# Patient Record
Sex: FEMALE | Employment: UNEMPLOYED | URBAN - METROPOLITAN AREA
[De-identification: names, ages, dates, MRNs, and addresses within clinical notes are randomized per-mention and may not be internally consistent; named-entity substitution may affect disease eponyms.]

---

## 2018-05-25 ENCOUNTER — HOSPITAL ENCOUNTER (EMERGENCY)
Age: 53
Discharge: HOME | End: 2018-05-25
Attending: EMERGENCY MEDICINE | Admitting: EMERGENCY MEDICINE
Payer: SELF-PAY

## 2018-05-25 ENCOUNTER — APPOINTMENT (OUTPATIENT)
Dept: GENERAL RADIOLOGY | Age: 53
End: 2018-05-25
Attending: PHYSICIAN ASSISTANT
Payer: SELF-PAY

## 2018-05-25 VITALS
DIASTOLIC BLOOD PRESSURE: 100 MMHG | RESPIRATION RATE: 20 BRPM | HEART RATE: 128 BPM | TEMPERATURE: 97.6 F | SYSTOLIC BLOOD PRESSURE: 138 MMHG | OXYGEN SATURATION: 95 %

## 2018-05-25 DIAGNOSIS — J18.9 PNEUMONIA OF RIGHT UPPER LOBE DUE TO INFECTIOUS ORGANISM: Primary | ICD-10-CM

## 2018-05-25 LAB
ANION GAP SERPL CALC-SCNC: 7 MMOL/L (ref 3–18)
BASOPHILS # BLD: 0 K/UL (ref 0–0.1)
BASOPHILS NFR BLD: 0 % (ref 0–2)
BUN SERPL-MCNC: 14 MG/DL (ref 7–18)
BUN/CREAT SERPL: 15 (ref 12–20)
CALCIUM SERPL-MCNC: 8.4 MG/DL (ref 8.5–10.1)
CHLORIDE SERPL-SCNC: 104 MMOL/L (ref 100–108)
CO2 SERPL-SCNC: 27 MMOL/L (ref 21–32)
CREAT SERPL-MCNC: 0.94 MG/DL (ref 0.6–1.3)
DIFFERENTIAL METHOD BLD: ABNORMAL
EOSINOPHIL # BLD: 0 K/UL (ref 0–0.4)
EOSINOPHIL NFR BLD: 0 % (ref 0–5)
ERYTHROCYTE [DISTWIDTH] IN BLOOD BY AUTOMATED COUNT: 12.4 % (ref 11.6–14.5)
GLUCOSE SERPL-MCNC: 118 MG/DL (ref 74–99)
HCT VFR BLD AUTO: 36.5 % (ref 35–45)
HGB BLD-MCNC: 12.8 G/DL (ref 12–16)
LYMPHOCYTES # BLD: 2.5 K/UL (ref 0.9–3.6)
LYMPHOCYTES NFR BLD: 27 % (ref 21–52)
MCH RBC QN AUTO: 31.2 PG (ref 24–34)
MCHC RBC AUTO-ENTMCNC: 35.1 G/DL (ref 31–37)
MCV RBC AUTO: 89 FL (ref 74–97)
MONOCYTES # BLD: 0.6 K/UL (ref 0.05–1.2)
MONOCYTES NFR BLD: 6 % (ref 3–10)
NEUTS SEG # BLD: 5.9 K/UL (ref 1.8–8)
NEUTS SEG NFR BLD: 67 % (ref 40–73)
PLATELET # BLD AUTO: 164 K/UL (ref 135–420)
PMV BLD AUTO: 9.6 FL (ref 9.2–11.8)
POTASSIUM SERPL-SCNC: 3.4 MMOL/L (ref 3.5–5.5)
RBC # BLD AUTO: 4.1 M/UL (ref 4.2–5.3)
SODIUM SERPL-SCNC: 138 MMOL/L (ref 136–145)
WBC # BLD AUTO: 9 K/UL (ref 4.6–13.2)

## 2018-05-25 PROCEDURE — 74011636637 HC RX REV CODE- 636/637: Performed by: EMERGENCY MEDICINE

## 2018-05-25 PROCEDURE — 94640 AIRWAY INHALATION TREATMENT: CPT

## 2018-05-25 PROCEDURE — 74011250636 HC RX REV CODE- 250/636: Performed by: PHYSICIAN ASSISTANT

## 2018-05-25 PROCEDURE — 74011250637 HC RX REV CODE- 250/637: Performed by: EMERGENCY MEDICINE

## 2018-05-25 PROCEDURE — 71045 X-RAY EXAM CHEST 1 VIEW: CPT

## 2018-05-25 PROCEDURE — 74011000250 HC RX REV CODE- 250: Performed by: PHYSICIAN ASSISTANT

## 2018-05-25 PROCEDURE — 77030029684 HC NEB SM VOL KT MONA -A

## 2018-05-25 PROCEDURE — 96374 THER/PROPH/DIAG INJ IV PUSH: CPT

## 2018-05-25 PROCEDURE — 99283 EMERGENCY DEPT VISIT LOW MDM: CPT

## 2018-05-25 PROCEDURE — 80048 BASIC METABOLIC PNL TOTAL CA: CPT | Performed by: PHYSICIAN ASSISTANT

## 2018-05-25 PROCEDURE — 85025 COMPLETE CBC W/AUTO DIFF WBC: CPT | Performed by: PHYSICIAN ASSISTANT

## 2018-05-25 RX ORDER — ALBUTEROL SULFATE 90 UG/1
2 AEROSOL, METERED RESPIRATORY (INHALATION)
Status: COMPLETED | OUTPATIENT
Start: 2018-05-25 | End: 2018-05-25

## 2018-05-25 RX ORDER — IPRATROPIUM BROMIDE AND ALBUTEROL SULFATE 2.5; .5 MG/3ML; MG/3ML
3 SOLUTION RESPIRATORY (INHALATION)
Status: COMPLETED | OUTPATIENT
Start: 2018-05-25 | End: 2018-05-25

## 2018-05-25 RX ORDER — PREDNISONE 20 MG/1
40 TABLET ORAL
Status: COMPLETED | OUTPATIENT
Start: 2018-05-25 | End: 2018-05-25

## 2018-05-25 RX ORDER — AZITHROMYCIN 250 MG/1
TABLET, FILM COATED ORAL
Qty: 4 TAB | Refills: 0 | Status: SHIPPED | OUTPATIENT
Start: 2018-05-26 | End: 2018-05-28

## 2018-05-25 RX ORDER — PREDNISONE 20 MG/1
40 TABLET ORAL DAILY
Qty: 8 TAB | Refills: 0 | Status: SHIPPED | OUTPATIENT
Start: 2018-05-26 | End: 2018-05-28

## 2018-05-25 RX ORDER — AZITHROMYCIN 250 MG/1
500 TABLET, FILM COATED ORAL
Status: COMPLETED | OUTPATIENT
Start: 2018-05-25 | End: 2018-05-25

## 2018-05-25 RX ADMIN — IPRATROPIUM BROMIDE AND ALBUTEROL SULFATE 3 ML: .5; 3 SOLUTION RESPIRATORY (INHALATION) at 15:39

## 2018-05-25 RX ADMIN — AZITHROMYCIN 500 MG: 250 TABLET, FILM COATED ORAL at 15:38

## 2018-05-25 RX ADMIN — ALBUTEROL SULFATE 2 PUFF: 90 AEROSOL, METERED RESPIRATORY (INHALATION) at 15:38

## 2018-05-25 RX ADMIN — METHYLPREDNISOLONE SODIUM SUCCINATE 125 MG: 125 INJECTION, POWDER, FOR SOLUTION INTRAMUSCULAR; INTRAVENOUS at 13:48

## 2018-05-25 RX ADMIN — PREDNISONE 40 MG: 20 TABLET ORAL at 15:38

## 2018-05-25 NOTE — ED PROVIDER NOTES
EMERGENCY DEPARTMENT HISTORY AND PHYSICAL EXAM    3:12 PM      Date: 5/25/2018  Patient Name: Tawanda Avery    History of Presenting Illness     Chief Complaint   Patient presents with    Cough         History Provided By: Patient    Chief Complaint: Cough    Duration: 10 Days  Timing:  Constant  Location: N/A  Quality: N/A  Severity: Moderate  Modifying Factors: Albuterol, Mucinex, and Tylenol did not help sx  Associated Symptoms: Intermitten fever      Additional History (Context): 3:15 PM Tawanda Avery is a 46 y.o. female with h/o fibromyalgia who presents to ED complaining of a moderate, constant non-productive cough with onset 10 days ago. Patient states that her albuterol, mucinex, and tylenol did not help. She has associated sx of an intermittent fever. She states that her grandchildren have been sick recently. She also state she \"feels like she can't breath\" and states she also has COPD. She currently smokes a half a pack a day. She has a history of DVT in her RLE. Patient is allergic to penicillin. No other concerns or symptoms at this time. PCP: Fredy Velez MD        PCP: Fredy Velez MD    Current Facility-Administered Medications   Medication Dose Route Frequency Provider Last Rate Last Dose    inhalational spacing device   Does Not Apply PRN Young Adames MD         Current Outpatient Prescriptions   Medication Sig Dispense Refill    [START ON 5/26/2018] azithromycin (ZITHROMAX) 250 mg tablet Does not need z-jojo- first dose given in ed  Take one tablet by mouth daily for next 4 days 4 Tab 0    [START ON 5/26/2018] predniSONE (DELTASONE) 20 mg tablet Take 2 Tabs by mouth daily for 4 days. With Breakfast 8 Tab 0    ibuprofen (MOTRIN) 800 mg tablet Take 800 mg by mouth daily.  ALPRAZolam (XANAX) 0.25 mg tablet Take 1 mg by mouth daily.  naproxen (NAPROSYN) 500 mg tablet Take 1 Tab by mouth two (2) times daily (with meals).  20 Tab 0    albuterol (PROVENTIL HFA, VENTOLIN HFA, PROAIR HFA) 90 mcg/actuation inhaler Take 2 Puffs by inhalation every four (4) hours as needed for Wheezing or Shortness of Breath. 1 Inhaler 0    oxyCODONE-acetaminophen (PERCOCET)  mg per tablet Take 1 Tab by mouth every six (6) hours as needed for Pain. Past History     Past Medical History:  Past Medical History:   Diagnosis Date    Chronic back pain     Fibromyalgia        Past Surgical History:  History reviewed. No pertinent surgical history. Family History:  History reviewed. No pertinent family history. Social History:  Social History   Substance Use Topics    Smoking status: Heavy Tobacco Smoker     Packs/day: 1.00     Years: 30.00    Smokeless tobacco: None    Alcohol use No       Allergies: Allergies   Allergen Reactions    Pcn [Penicillins] Hives         Review of Systems       Review of Systems   Constitutional: Positive for fever. Negative for activity change, appetite change, chills, diaphoresis, fatigue and unexpected weight change. HENT: Negative for congestion, dental problem, drooling, ear discharge, ear pain, facial swelling, hearing loss, mouth sores, nosebleeds, postnasal drip, rhinorrhea, sinus pressure, sneezing, sore throat, tinnitus and trouble swallowing. Eyes: Negative for photophobia, pain, discharge, redness, itching and visual disturbance. Respiratory: Positive for cough. Negative for apnea, choking, chest tightness, shortness of breath, wheezing and stridor. Cardiovascular: Negative for chest pain, palpitations and leg swelling. Gastrointestinal: Negative for abdominal distention, abdominal pain, anal bleeding, blood in stool, constipation, diarrhea, nausea, rectal pain and vomiting. Endocrine: Negative for cold intolerance, heat intolerance, polydipsia, polyphagia and polyuria. Genitourinary: Negative for decreased urine volume, difficulty urinating, dysuria, enuresis, flank pain, frequency, genital sores, hematuria and urgency. Musculoskeletal: Negative for arthralgias, back pain, gait problem, joint swelling, myalgias, neck pain and neck stiffness. Skin: Negative for color change, pallor, rash and wound. Allergic/Immunologic: Negative for environmental allergies, food allergies and immunocompromised state. Neurological: Negative for dizziness, tremors, seizures, syncope, facial asymmetry, speech difficulty, weakness, light-headedness, numbness and headaches. Hematological: Negative for adenopathy. Does not bruise/bleed easily. Psychiatric/Behavioral: Negative for agitation, behavioral problems, confusion, decreased concentration, dysphoric mood, hallucinations, self-injury, sleep disturbance and suicidal ideas. The patient is not nervous/anxious and is not hyperactive. Physical Exam     Visit Vitals    BP (!) 138/100 (BP 1 Location: Left arm, BP Patient Position: Sitting)    Pulse (!) 128    Temp 97.6 °F (36.4 °C)    Resp 20    SpO2 95%         Physical Exam   Constitutional: She is oriented to person, place, and time. She appears well-developed and well-nourished. Alert and appropriate in no apparent marked discomfort or acute respiratory distress   HENT:   Head: Normocephalic and atraumatic. Right Ear: External ear normal.   Left Ear: External ear normal.   Mouth/Throat: Oropharynx is clear and moist. No oropharyngeal exudate. Eyes: Conjunctivae and EOM are normal. Pupils are equal, round, and reactive to light. Right eye exhibits no discharge. Left eye exhibits no discharge. No scleral icterus. Neck: Normal range of motion. Neck supple. No JVD present. No tracheal deviation present. No thyromegaly present. Cardiovascular: Regular rhythm, normal heart sounds and intact distal pulses. Exam reveals no gallop and no friction rub. No murmur heard. Tachycardia but regular   Pulmonary/Chest: Effort normal. No respiratory distress. She has no wheezes. She has no rales. She exhibits no tenderness. Breath sounds distant without rhonchi, prolonged expiratory phase or diminished air exchange   Abdominal: Soft. Bowel sounds are normal. She exhibits no distension. There is no tenderness. There is no rebound and no guarding. Musculoskeletal: Normal range of motion. She exhibits no edema or tenderness. Lymphadenopathy:     She has no cervical adenopathy. Neurological: She is alert and oriented to person, place, and time. No cranial nerve deficit. Coordination normal.   Skin: Skin is warm. No erythema. Psychiatric: She has a normal mood and affect. Her behavior is normal. Judgment and thought content normal.   Nursing note and vitals reviewed. Diagnostic Study Results     Labs -  Recent Results (from the past 12 hour(s))   CBC WITH AUTOMATED DIFF    Collection Time: 05/25/18  2:43 PM   Result Value Ref Range    WBC 9.0 4.6 - 13.2 K/uL    RBC 4.10 (L) 4.20 - 5.30 M/uL    HGB 12.8 12.0 - 16.0 g/dL    HCT 36.5 35.0 - 45.0 %    MCV 89.0 74.0 - 97.0 FL    MCH 31.2 24.0 - 34.0 PG    MCHC 35.1 31.0 - 37.0 g/dL    RDW 12.4 11.6 - 14.5 %    PLATELET 712 488 - 830 K/uL    MPV 9.6 9.2 - 11.8 FL    NEUTROPHILS 67 40 - 73 %    LYMPHOCYTES 27 21 - 52 %    MONOCYTES 6 3 - 10 %    EOSINOPHILS 0 0 - 5 %    BASOPHILS 0 0 - 2 %    ABS. NEUTROPHILS 5.9 1.8 - 8.0 K/UL    ABS. LYMPHOCYTES 2.5 0.9 - 3.6 K/UL    ABS. MONOCYTES 0.6 0.05 - 1.2 K/UL    ABS. EOSINOPHILS 0.0 0.0 - 0.4 K/UL    ABS.  BASOPHILS 0.0 0.0 - 0.1 K/UL    DF AUTOMATED     METABOLIC PANEL, BASIC    Collection Time: 05/25/18  2:43 PM   Result Value Ref Range    Sodium 138 136 - 145 mmol/L    Potassium 3.4 (L) 3.5 - 5.5 mmol/L    Chloride 104 100 - 108 mmol/L    CO2 27 21 - 32 mmol/L    Anion gap 7 3.0 - 18 mmol/L    Glucose 118 (H) 74 - 99 mg/dL    BUN 14 7.0 - 18 MG/DL    Creatinine 0.94 0.6 - 1.3 MG/DL    BUN/Creatinine ratio 15 12 - 20      GFR est AA >60 >60 ml/min/1.73m2    GFR est non-AA >60 >60 ml/min/1.73m2    Calcium 8.4 (L) 8.5 - 10.1 MG/DL Radiologic Studies -   XR CHEST PORT   Final Result   IMPRESSION  IMPRESSION:     Patchy focal infiltrate in the right upper lung likely pneumonia. Recommend  follow-up to document clearing with repeat x-ray in 6-8 weeks. Mild cardiomegaly. Atherosclerosis. Medical Decision Making   I am the first provider for this patient. I reviewed the vital signs, available nursing notes, past medical history, past surgical history, family history and social history. Vital Signs-Reviewed the patient's vital signs. Pulse Oximetry Analysis -  95% on room air (Interpretation)    Records Reviewed: Nursing Notes and Old Medical Records (Time of Review: 3:12 PM)    ED Course: Progress Notes, Reevaluation, and Consults:  Patient had no signs of fever, hemodynamic instability or hypoxemia. She was given oral steroids and antibiotics with planned close outpatient follow-up. Precautions given for pneumonia and COPD prior to discharge. Patient agreed with this plan and will follow-up for blood pressure recheck when no longer acutely ill. Provider Notes (Medical Decision Making): Patient with history consistent bronchitis versus pneumonia worsened by underlying COPD. Prior history of DVT but infectious source much more likely. If CXR is unremarkable with reconsider DVT/PE evaluation but no chest pain or leg swelling. Will cover for infectious sources as well as likely COPD component while evaluating. Patient agrees with this plan. Diagnosis     Clinical Impression:   1.  Pneumonia of right upper lobe due to infectious organism [facility])        Disposition: Discharge    Follow-up Information     Follow up With Details Comments Contact Info    [facility] EMERGENCY DEPT  As needed, If symptoms worsen Alessandro Kumar MD In 3 days As needed, If symptoms are not improving- also will need follow-up to make sure pneumonia on x-ray has resolved [id number] [id number]  [phone]             Discharge Medication List as of 5/25/2018  4:05 PM      START taking these medications    Details   azithromycin (ZITHROMAX) 250 mg tablet Does not need z-jojo- first dose given in ed  Take one tablet by mouth daily for next 4 days, Print, Disp-4 Tab, R-0      predniSONE (DELTASONE) 20 mg tablet Take 2 Tabs by mouth daily for 4 days. With Breakfast, Print, Disp-8 Tab, R-0         CONTINUE these medications which have NOT CHANGED    Details   ibuprofen (MOTRIN) 800 mg tablet Take 800 mg by mouth daily. , Historical Med      ALPRAZolam (XANAX) 0.25 mg tablet Take 1 mg by mouth daily. , Historical Med      naproxen (NAPROSYN) 500 mg tablet Take 1 Tab by mouth two (2) times daily (with meals). , Print, Disp-20 Tab, R-0      albuterol (PROVENTIL HFA, VENTOLIN HFA, PROAIR HFA) 90 mcg/actuation inhaler Take 2 Puffs by inhalation every four (4) hours as needed for Wheezing or Shortness of Breath., Print, Disp-1 Inhaler, R-0      oxyCODONE-acetaminophen (PERCOCET)  mg per tablet Take 1 Tab by mouth every six (6) hours as needed for Pain., Historical Med         STOP taking these medications       predniSONE (STERAPRED DS) 10 mg dose pack Comments:   Reason for Stopping:             _______________________________    Attestations:  Scribe Attestation     Ondina Sapp and Cassie Scott acting as a scribe for and in the presence of Maida Harris MD      May 25, 2018 at 3:12 PM       Provider Attestation:      I personally performed the services described in the documentation, reviewed the documentation, as recorded by the scribe in my presence, and it accurately and completely records my words and actions.  May 25, 2018 at 3:12 PM - Maida Harris MD    _______________________________

## 2018-05-25 NOTE — ED TRIAGE NOTES
Pt to ED with C/o Cough and SOB, pt able to speak in full sentences without difficulty and states \" I cant breath\"

## 2018-05-25 NOTE — ED NOTES
Assumed care of pt. Pt reports to ED with c/o \"congestion\" for 10 days. Pt states \"I'm spitting up yellow/green with small amounts of blood periodically. \" pt reports associated Fever, chill, SOB and CP with cough. Denies N/V. Pt noted to have expiratory wheezes at bases. A&Ox4. Visitor at bedside. Will continue to monitor.

## 2018-05-25 NOTE — DISCHARGE INSTRUCTIONS
Continue Albuterol MDI with space 2 puffs every 4 hours while awake for 5 days and then as needed  Take next doses of prednisone and antibiotic tomorrow  Stop smoking  Return immediately for increasing pain, fever lasting more than 2-3 days, difficulty breathing, or any other concerns           Pneumonia: Care Instructions  Your Care Instructions    Pneumonia is an infection of the lungs. Most cases are caused by infections from bacteria or viruses. Pneumonia may be mild or very severe. If it is caused by bacteria, you will be treated with antibiotics. It may take a few weeks to a few months to recover fully from pneumonia, depending on how sick you were and whether your overall health is good. Follow-up care is a key part of your treatment and safety. Be sure to make and go to all appointments, and call your doctor if you are having problems. It's also a good idea to know your test results and keep a list of the medicines you take. How can you care for yourself at home? · Take your antibiotics exactly as directed. Do not stop taking the medicine just because you are feeling better. You need to take the full course of antibiotics. · Take your medicines exactly as prescribed. Call your doctor if you think you are having a problem with your medicine. · Get plenty of rest and sleep. You may feel weak and tired for a while, but your energy level will improve with time. · To prevent dehydration, drink plenty of fluids, enough so that your urine is light yellow or clear like water. Choose water and other caffeine-free clear liquids until you feel better. If you have kidney, heart, or liver disease and have to limit fluids, talk with your doctor before you increase the amount of fluids you drink. · Take care of your cough so you can rest. A cough that brings up mucus from your lungs is common with pneumonia. It is one way your body gets rid of the infection.  But if coughing keeps you from resting or causes severe fatigue and chest-wall pain, talk to your doctor. He or she may suggest that you take a medicine to reduce the cough. · Use a vaporizer or humidifier to add moisture to your bedroom. Follow the directions for cleaning the machine. · Do not smoke or allow others to smoke around you. Smoke will make your cough last longer. If you need help quitting, talk to your doctor about stop-smoking programs and medicines. These can increase your chances of quitting for good. · Take an over-the-counter pain medicine, such as acetaminophen (Tylenol), ibuprofen (Advil, Motrin), or naproxen (Aleve). Read and follow all instructions on the label. · Do not take two or more pain medicines at the same time unless the doctor told you to. Many pain medicines have acetaminophen, which is Tylenol. Too much acetaminophen (Tylenol) can be harmful. · If you were given a spirometer to measure how well your lungs are working, use it as instructed. This can help your doctor tell how your recovery is going. · To prevent pneumonia in the future, talk to your doctor about getting a flu vaccine (once a year) and a pneumococcal vaccine (one time only for most people). When should you call for help? Call 911 anytime you think you may need emergency care. For example, call if:  ? · You have severe trouble breathing. ?Call your doctor now or seek immediate medical care if:  ? · You cough up dark brown or bloody mucus (sputum). ? · You have new or worse trouble breathing. ? · You are dizzy or lightheaded, or you feel like you may faint. ? Watch closely for changes in your health, and be sure to contact your doctor if:  ? · You have a new or higher fever. ? · You are coughing more deeply or more often. ? · You are not getting better after 2 days (48 hours). ? · You do not get better as expected. Where can you learn more? Go to http://shan-swati.info/.   Enter [id number] in the search box to learn more about \"Pneumonia: Care Instructions. \"  Current as of: May 12, 2017  Content Version: 11.4  © 2355-4160 [employer], [employer]. Care instructions adapted under license by [employer] (which disclaims liability or warranty for this information). If you have questions about a medical condition or this instruction, always ask your healthcare professional. [place] [address] any warranty or liability for your use of this information.

## 2018-05-27 ENCOUNTER — HOSPITAL ENCOUNTER (INPATIENT)
Age: 53
LOS: 1 days | Discharge: HOME | DRG: 192 | End: 2018-05-28
Attending: EMERGENCY MEDICINE | Admitting: EMERGENCY MEDICINE
Payer: SELF-PAY

## 2018-05-27 ENCOUNTER — APPOINTMENT (OUTPATIENT)
Dept: GENERAL RADIOLOGY | Age: 53
DRG: 192 | End: 2018-05-27
Attending: EMERGENCY MEDICINE
Payer: SELF-PAY

## 2018-05-27 DIAGNOSIS — J44.1 COPD EXACERBATION (HCC): Primary | ICD-10-CM

## 2018-05-27 DIAGNOSIS — J18.9 PNEUMONIA OF RIGHT LOWER LOBE DUE TO INFECTIOUS ORGANISM: ICD-10-CM

## 2018-05-27 PROBLEM — E87.6 HYPOKALEMIA: Status: ACTIVE | Noted: 2018-05-27

## 2018-05-27 PROBLEM — F41.9 ANXIETY: Status: ACTIVE | Noted: 2018-05-27

## 2018-05-27 LAB
ALBUMIN SERPL-MCNC: 3.8 G/DL (ref 3.4–5)
ALBUMIN/GLOB SERPL: 0.8 {RATIO} (ref 0.8–1.7)
ALP SERPL-CCNC: 86 U/L (ref 45–117)
ALT SERPL-CCNC: 27 U/L (ref 13–56)
ANION GAP SERPL CALC-SCNC: 10 MMOL/L (ref 3–18)
AST SERPL-CCNC: 22 U/L (ref 15–37)
ATRIAL RATE: 108 BPM
BASOPHILS # BLD: 0 K/UL (ref 0–0.1)
BASOPHILS NFR BLD: 0 % (ref 0–2)
BILIRUB SERPL-MCNC: 0.5 MG/DL (ref 0.2–1)
BUN SERPL-MCNC: 10 MG/DL (ref 7–18)
BUN/CREAT SERPL: 11 (ref 12–20)
CALCIUM SERPL-MCNC: 9.2 MG/DL (ref 8.5–10.1)
CALCULATED P AXIS, ECG09: 93 DEGREES
CALCULATED R AXIS, ECG10: 96 DEGREES
CALCULATED T AXIS, ECG11: -53 DEGREES
CHLORIDE SERPL-SCNC: 105 MMOL/L (ref 100–108)
CO2 SERPL-SCNC: 26 MMOL/L (ref 21–32)
CREAT SERPL-MCNC: 0.91 MG/DL (ref 0.6–1.3)
DIAGNOSIS, 93000: NORMAL
DIFFERENTIAL METHOD BLD: ABNORMAL
EOSINOPHIL # BLD: 0 K/UL (ref 0–0.4)
EOSINOPHIL NFR BLD: 0 % (ref 0–5)
ERYTHROCYTE [DISTWIDTH] IN BLOOD BY AUTOMATED COUNT: 12.7 % (ref 11.6–14.5)
GLOBULIN SER CALC-MCNC: 4.7 G/DL (ref 2–4)
GLUCOSE SERPL-MCNC: 108 MG/DL (ref 74–99)
HCT VFR BLD AUTO: 37 % (ref 35–45)
HGB BLD-MCNC: 13 G/DL (ref 12–16)
LACTATE BLD-SCNC: 3.1 MMOL/L (ref 0.4–2)
LACTATE BLD-SCNC: 5.8 MMOL/L (ref 0.4–2)
LACTATE BLD-SCNC: 6.3 MMOL/L (ref 0.4–2)
LACTATE SERPL-SCNC: 4.2 MMOL/L (ref 0.4–2)
LYMPHOCYTES # BLD: 1.1 K/UL (ref 0.9–3.6)
LYMPHOCYTES NFR BLD: 9 % (ref 21–52)
MCH RBC QN AUTO: 31.2 PG (ref 24–34)
MCHC RBC AUTO-ENTMCNC: 35.1 G/DL (ref 31–37)
MCV RBC AUTO: 88.7 FL (ref 74–97)
MONOCYTES # BLD: 0.4 K/UL (ref 0.05–1.2)
MONOCYTES NFR BLD: 3 % (ref 3–10)
NEUTS SEG # BLD: 11.5 K/UL (ref 1.8–8)
NEUTS SEG NFR BLD: 88 % (ref 40–73)
P-R INTERVAL, ECG05: 182 MS
PLATELET # BLD AUTO: 207 K/UL (ref 135–420)
PMV BLD AUTO: 9.1 FL (ref 9.2–11.8)
POTASSIUM SERPL-SCNC: 3.4 MMOL/L (ref 3.5–5.5)
PROT SERPL-MCNC: 8.5 G/DL (ref 6.4–8.2)
Q-T INTERVAL, ECG07: 322 MS
QRS DURATION, ECG06: 80 MS
QTC CALCULATION (BEZET), ECG08: 431 MS
RBC # BLD AUTO: 4.17 M/UL (ref 4.2–5.3)
SODIUM SERPL-SCNC: 141 MMOL/L (ref 136–145)
VENTRICULAR RATE, ECG03: 108 BPM
WBC # BLD AUTO: 13 K/UL (ref 4.6–13.2)

## 2018-05-27 PROCEDURE — 93005 ELECTROCARDIOGRAM TRACING: CPT

## 2018-05-27 PROCEDURE — 94761 N-INVAS EAR/PLS OXIMETRY MLT: CPT

## 2018-05-27 PROCEDURE — 96375 TX/PRO/DX INJ NEW DRUG ADDON: CPT

## 2018-05-27 PROCEDURE — 74011250637 HC RX REV CODE- 250/637: Performed by: INTERNAL MEDICINE

## 2018-05-27 PROCEDURE — 77030029684 HC NEB SM VOL KT MONA -A

## 2018-05-27 PROCEDURE — 74011000250 HC RX REV CODE- 250: Performed by: EMERGENCY MEDICINE

## 2018-05-27 PROCEDURE — 87040 BLOOD CULTURE FOR BACTERIA: CPT | Performed by: EMERGENCY MEDICINE

## 2018-05-27 PROCEDURE — 36415 COLL VENOUS BLD VENIPUNCTURE: CPT | Performed by: INTERNAL MEDICINE

## 2018-05-27 PROCEDURE — 83605 ASSAY OF LACTIC ACID: CPT | Performed by: INTERNAL MEDICINE

## 2018-05-27 PROCEDURE — 99283 EMERGENCY DEPT VISIT LOW MDM: CPT

## 2018-05-27 PROCEDURE — 65270000029 HC RM PRIVATE

## 2018-05-27 PROCEDURE — 83605 ASSAY OF LACTIC ACID: CPT

## 2018-05-27 PROCEDURE — 74011250636 HC RX REV CODE- 250/636: Performed by: EMERGENCY MEDICINE

## 2018-05-27 PROCEDURE — 96365 THER/PROPH/DIAG IV INF INIT: CPT

## 2018-05-27 PROCEDURE — 94640 AIRWAY INHALATION TREATMENT: CPT

## 2018-05-27 PROCEDURE — 85025 COMPLETE CBC W/AUTO DIFF WBC: CPT | Performed by: EMERGENCY MEDICINE

## 2018-05-27 PROCEDURE — 80053 COMPREHEN METABOLIC PANEL: CPT | Performed by: EMERGENCY MEDICINE

## 2018-05-27 PROCEDURE — 71045 X-RAY EXAM CHEST 1 VIEW: CPT

## 2018-05-27 PROCEDURE — 74011250636 HC RX REV CODE- 250/636: Performed by: INTERNAL MEDICINE

## 2018-05-27 RX ORDER — POTASSIUM CHLORIDE 20 MEQ/1
40 TABLET, EXTENDED RELEASE ORAL DAILY
Status: COMPLETED | OUTPATIENT
Start: 2018-05-27 | End: 2018-05-28

## 2018-05-27 RX ORDER — ALPRAZOLAM 0.5 MG/1
1 TABLET ORAL
Status: DISCONTINUED | OUTPATIENT
Start: 2018-05-27 | End: 2018-05-28 | Stop reason: HOSPADM

## 2018-05-27 RX ORDER — IPRATROPIUM BROMIDE 0.5 MG/2.5ML
0.5 SOLUTION RESPIRATORY (INHALATION)
Status: COMPLETED | OUTPATIENT
Start: 2018-05-27 | End: 2018-05-27

## 2018-05-27 RX ORDER — MORPHINE SULFATE 4 MG/ML
1 INJECTION INTRAVENOUS
Status: DISCONTINUED | OUTPATIENT
Start: 2018-05-27 | End: 2018-05-28 | Stop reason: HOSPADM

## 2018-05-27 RX ORDER — ACETAMINOPHEN 325 MG/1
650 TABLET ORAL
Status: DISCONTINUED | OUTPATIENT
Start: 2018-05-27 | End: 2018-05-28 | Stop reason: HOSPADM

## 2018-05-27 RX ORDER — SODIUM CHLORIDE 9 MG/ML
100 INJECTION, SOLUTION INTRAVENOUS CONTINUOUS
Status: DISCONTINUED | OUTPATIENT
Start: 2018-05-27 | End: 2018-05-28 | Stop reason: HOSPADM

## 2018-05-27 RX ORDER — GUAIFENESIN 600 MG/1
600 TABLET, EXTENDED RELEASE ORAL EVERY 12 HOURS
Status: DISCONTINUED | OUTPATIENT
Start: 2018-05-27 | End: 2018-05-28 | Stop reason: HOSPADM

## 2018-05-27 RX ORDER — OXYCODONE AND ACETAMINOPHEN 5; 325 MG/1; MG/1
1 TABLET ORAL
Status: DISCONTINUED | OUTPATIENT
Start: 2018-05-27 | End: 2018-05-28 | Stop reason: HOSPADM

## 2018-05-27 RX ORDER — SODIUM CHLORIDE 0.9 % (FLUSH) 0.9 %
5-10 SYRINGE (ML) INJECTION AS NEEDED
Status: DISCONTINUED | OUTPATIENT
Start: 2018-05-27 | End: 2018-05-28 | Stop reason: HOSPADM

## 2018-05-27 RX ORDER — HEPARIN SODIUM 5000 [USP'U]/ML
5000 INJECTION, SOLUTION INTRAVENOUS; SUBCUTANEOUS EVERY 8 HOURS
Status: DISCONTINUED | OUTPATIENT
Start: 2018-05-27 | End: 2018-05-28 | Stop reason: HOSPADM

## 2018-05-27 RX ORDER — GUAIFENESIN 100 MG/5ML
100 SOLUTION ORAL
Status: DISCONTINUED | OUTPATIENT
Start: 2018-05-27 | End: 2018-05-28 | Stop reason: HOSPADM

## 2018-05-27 RX ORDER — ALPRAZOLAM 0.5 MG/1
1 TABLET ORAL
Status: DISCONTINUED | OUTPATIENT
Start: 2018-05-27 | End: 2018-05-27

## 2018-05-27 RX ORDER — DOCUSATE SODIUM 100 MG/1
100 CAPSULE, LIQUID FILLED ORAL
Status: DISCONTINUED | OUTPATIENT
Start: 2018-05-27 | End: 2018-05-28 | Stop reason: HOSPADM

## 2018-05-27 RX ORDER — KETOROLAC TROMETHAMINE 30 MG/ML
30 INJECTION, SOLUTION INTRAMUSCULAR; INTRAVENOUS
Status: DISCONTINUED | OUTPATIENT
Start: 2018-05-27 | End: 2018-05-27

## 2018-05-27 RX ORDER — ALBUTEROL SULFATE 0.83 MG/ML
5 SOLUTION RESPIRATORY (INHALATION)
Status: COMPLETED | OUTPATIENT
Start: 2018-05-27 | End: 2018-05-27

## 2018-05-27 RX ORDER — IBUPROFEN 200 MG
1 TABLET ORAL DAILY
Status: DISCONTINUED | OUTPATIENT
Start: 2018-05-27 | End: 2018-05-28 | Stop reason: HOSPADM

## 2018-05-27 RX ORDER — ONDANSETRON 2 MG/ML
4 INJECTION INTRAMUSCULAR; INTRAVENOUS
Status: DISCONTINUED | OUTPATIENT
Start: 2018-05-27 | End: 2018-05-28 | Stop reason: HOSPADM

## 2018-05-27 RX ORDER — NALOXONE HYDROCHLORIDE 0.4 MG/ML
0.4 INJECTION, SOLUTION INTRAMUSCULAR; INTRAVENOUS; SUBCUTANEOUS AS NEEDED
Status: DISCONTINUED | OUTPATIENT
Start: 2018-05-27 | End: 2018-05-28 | Stop reason: HOSPADM

## 2018-05-27 RX ORDER — LEVOFLOXACIN 5 MG/ML
750 INJECTION, SOLUTION INTRAVENOUS EVERY 24 HOURS
Status: DISCONTINUED | OUTPATIENT
Start: 2018-05-27 | End: 2018-05-28 | Stop reason: HOSPADM

## 2018-05-27 RX ORDER — IBUPROFEN 200 MG
1 TABLET ORAL DAILY
Status: DISCONTINUED | OUTPATIENT
Start: 2018-05-28 | End: 2018-05-27

## 2018-05-27 RX ADMIN — GUAIFENESIN 100 MG: 200 SOLUTION ORAL at 19:39

## 2018-05-27 RX ADMIN — SODIUM CHLORIDE 1000 ML: 900 INJECTION, SOLUTION INTRAVENOUS at 15:04

## 2018-05-27 RX ADMIN — IPRATROPIUM BROMIDE 0.5 MG: 0.5 SOLUTION RESPIRATORY (INHALATION) at 13:07

## 2018-05-27 RX ADMIN — GUAIFENESIN 600 MG: 600 TABLET, EXTENDED RELEASE ORAL at 20:32

## 2018-05-27 RX ADMIN — METHYLPREDNISOLONE SODIUM SUCCINATE 125 MG: 125 INJECTION, POWDER, FOR SOLUTION INTRAMUSCULAR; INTRAVENOUS at 13:08

## 2018-05-27 RX ADMIN — SODIUM CHLORIDE 100 ML/HR: 900 INJECTION, SOLUTION INTRAVENOUS at 17:54

## 2018-05-27 RX ADMIN — OXYCODONE HYDROCHLORIDE AND ACETAMINOPHEN 1 TABLET: 5; 325 TABLET ORAL at 15:14

## 2018-05-27 RX ADMIN — LEVOFLOXACIN 750 MG: 5 INJECTION, SOLUTION INTRAVENOUS at 13:08

## 2018-05-27 RX ADMIN — ALBUTEROL SULFATE 5 MG: 2.5 SOLUTION RESPIRATORY (INHALATION) at 13:07

## 2018-05-27 RX ADMIN — OXYCODONE HYDROCHLORIDE AND ACETAMINOPHEN 1 TABLET: 5; 325 TABLET ORAL at 21:22

## 2018-05-27 RX ADMIN — METHYLPREDNISOLONE SODIUM SUCCINATE 40 MG: 40 INJECTION, POWDER, FOR SOLUTION INTRAMUSCULAR; INTRAVENOUS at 18:53

## 2018-05-27 RX ADMIN — HEPARIN SODIUM 5000 UNITS: 5000 INJECTION, SOLUTION INTRAVENOUS; SUBCUTANEOUS at 15:14

## 2018-05-27 RX ADMIN — POTASSIUM CHLORIDE 40 MEQ: 1500 TABLET, EXTENDED RELEASE ORAL at 15:13

## 2018-05-27 RX ADMIN — ALPRAZOLAM 1 MG: 0.5 TABLET ORAL at 20:31

## 2018-05-27 RX ADMIN — CEFEPIME HYDROCHLORIDE 2 G: 2 INJECTION, POWDER, FOR SOLUTION INTRAVENOUS at 13:07

## 2018-05-27 RX ADMIN — KETOROLAC TROMETHAMINE 30 MG: 30 INJECTION, SOLUTION INTRAMUSCULAR at 20:38

## 2018-05-27 RX ADMIN — SODIUM CHLORIDE 728 ML: 9 INJECTION, SOLUTION INTRAVENOUS at 13:34

## 2018-05-27 RX ADMIN — HEPARIN SODIUM 5000 UNITS: 5000 INJECTION, SOLUTION INTRAVENOUS; SUBCUTANEOUS at 22:11

## 2018-05-27 NOTE — ROUTINE PROCESS
Bedside shift change report given to [facility] (oncoming nurse) by Audrey Samayoa RN   (offgoing nurse). Report included the following information SBAR, Kardex, MAR and Recent Results.

## 2018-05-27 NOTE — ED NOTES
Patient given medication per Mar call santiago within reach.  Patient resting at this time call bell within reach

## 2018-05-27 NOTE — ED NOTES
Patient resting well Patient VSS. Patient has no additional wants or needs noted family at the bedside.

## 2018-05-27 NOTE — ROUTINE PROCESS
Received pt  From ED via stretcher. Pt c/o pain and coughing. VSS. Will review orders and continue to monitor. 26 MD notified and aware. Awaiting for orders.

## 2018-05-27 NOTE — ED TRIAGE NOTES
Patient states she was diagnosed with pneumonia and was told to come back to the ED if the medicine did not kick in.  Patient  Is anxious

## 2018-05-27 NOTE — H&P
History & Physical    Patient: Alysha Pak MRN: [id number]  CSN: [id number]    YOB: 1965  Age: 46 y.o. Sex: female      DOA: 5/27/2018    Chief Complaint:   Chief Complaint   Patient presents with    Pneumonia    Cough          HPI:     Alysha Pak is a 46 y.o.  female who has PMH of COPD, HTN, severe anxiety and tobacco abuse. Pt presents with prgressively worsening SOB and productive Cough with yellow sputum and heavy wheezing for the last few days. Responded well inhalers and steroids but desaturase on minimal exertion and has elevated lactic acid. Currently more stable but complaints of back pain and very emotional.  Denies cP, abdominal pain and has no urinary Sx    Past Medical History:   Diagnosis Date    Chronic back pain     Fibromyalgia        No past surgical history on file. No family history on file. Social History     Social History    Marital status: SINGLE     Spouse name: N/A    Number of children: N/A    Years of education: N/A     Social History Main Topics    Smoking status: Heavy Tobacco Smoker     Packs/day: 1.00     Years: 30.00    Smokeless tobacco: Not on file    Alcohol use No    Drug use: No    Sexual activity: Yes     Other Topics Concern    Not on file     Social History Narrative       Prior to Admission medications    Medication Sig Start Date End Date Taking? Authorizing Provider   azithromycin (ZITHROMAX) 250 mg tablet Does not need z-jojo- first dose given in ed  Take one tablet by mouth daily for next 4 days 5/26/18 5/30/18  Rei Ramirez MD   predniSONE (DELTASONE) 20 mg tablet Take 2 Tabs by mouth daily for 4 days. With Breakfast 5/26/18 5/30/18  Rei Ramirez MD   ibuprofen (MOTRIN) 800 mg tablet Take 800 mg by mouth daily. Willam Jo MD   ALPRAZolam Anushka Scales) 0.25 mg tablet Take 1 mg by mouth daily. Willam Jo MD   naproxen (NAPROSYN) 500 mg tablet Take 1 Tab by mouth two (2) times daily (with meals).  10/5/16 Carlos Borja DO   albuterol (PROVENTIL HFA, VENTOLIN HFA, PROAIR HFA) 90 mcg/actuation inhaler Take 2 Puffs by inhalation every four (4) hours as needed for Wheezing or Shortness of Breath. 10/27/15   EDMUNDO Bobby   oxyCODONE-acetaminophen (PERCOCET)  mg per tablet Take 1 Tab by mouth every six (6) hours as needed for Pain. Phys Other, MD       Allergies   Allergen Reactions    Pcn [Penicillins] Hives         Review of Systems  GENERAL: Patient alert, awake and oriented times 3, Unable to communicate full sentences and in distress. HEENT: No change in vision, no earache, tinnitus, sore throat or sinus congestion. NECK: No pain or stiffness. PULMONARY: +ve shortness of breath, cough or wheeze. Cardiovascular: no pnd / orthopnea, no CP  GASTROINTESTINAL: No abdominal pain, nausea, vomiting or diarrhea, melena or bright red blood per rectum. GENITOURINARY: No urinary frequency, urgency, hesitancy or dysuria. MUSCULOSKELETAL: No joint or muscle pain, no back pain, no recent trauma. DERMATOLOGIC: No rash, no itching, no lesions. ENDOCRINE: No polyuria, polydipsia, no heat or cold intolerance. No recent change in weight. HEMATOLOGICAL: No anemia or easy bruising or bleeding. NEUROLOGIC: No headache, seizures, numbness, tingling or weakness. Physical Exam:     Physical Exam:  Visit Vitals    /87 (BP 1 Location: Left arm, BP Patient Position: At rest)    Pulse 98    Temp 97.1 °F (36.2 °C)    Resp 22    Ht 5' 9\" (1.753 m)    Wt 57.6 kg (127 lb)    SpO2 97%    BMI 18.75 kg/m2      O2 Device: Room air    Temp (24hrs), Av.1 °F (36.2 °C), Min:97.1 °F (36.2 °C), Max:97.1 °F (36.2 °C)             General:  Alert, cooperative, in distress, appears stated age. Head: Normocephalic, without obvious abnormality, atraumatic. Eyes:  Conjunctivae/corneas clear. PERRL, EOMs intact. Nose: Nares normal. No drainage or sinus tenderness.    Neck: Supple, symmetrical, trachea midline, no adenopathy, thyroid: no enlargement, no carotid bruit and no JVD. Lungs:   Decrease Bs with B/L wheezing    Heart:  Regular rate and rhythm, S1, S2 normal.     Abdomen: Soft, non-tender. Bowel sounds normal.    Extremities: Extremities normal, atraumatic, no cyanosis or edema. Pulses: 2+ and symmetric all extremities. Skin:  No rashes or lesions   Neurologic: AAOx3, No focal motor or sensory deficit. Labs Reviewed: All lab results for the last 24 hours reviewed. CXR and EKG    Procedures/imaging: see electronic medical records for all procedures/Xrays and details which were not copied into this note but were reviewed prior to creation of Plan      Assessment/Plan     Principal Problem:    COPD exacerbation ([facility] [address] (5/27/2018)    Active Problems:    Pneumonia (5/27/2018)      Hypokalemia (5/27/2018)      Anxiety (5/27/2018)       Pt will be admitted to [facility] for COPD exacerbation 2 ry to CAP    Will continue IV steroids, Bronchodilators  Empiric antibiotics and Mucolytic's. Sputum cultures and sensitivity. If patient does not respond to the above measures will get ABG and will initiate NPPV.     Continue Xanax for anxiety     Tobacco  cessation advised >> nicotine Patch    Pain MGT for Sciatica like picture pain  DVT/GI Prophylaxis: Hep SQ    Plan of care is discussed in details with Patient/Family at bedside and agreed upon    Zach Dave MD  5/27/2018 2:43 PM

## 2018-05-27 NOTE — ED NOTES
Patient assessment completed at this time. Patient has a non-productive cough at this time. Family at the bedside patient diaphoretic.  Patient has no additional wants or needs noted call bell within reach

## 2018-05-27 NOTE — ED NOTES
TRANSFER - OUT REPORT:    Verbal report given to Jes CEDILLO (name) on Hina Fast  being transferred to [phone] for routine progression of care       Report consisted of patients Situation, Background, Assessment and   Recommendations(SBAR). Information from the following report(s) ED Summary was reviewed with the receiving nurse. Lines:   Peripheral IV 05/27/18 Right Antecubital (Active)   Site Assessment Clean, dry, & intact 5/27/2018 11:33 AM   Dressing Status Clean, dry, & intact 5/27/2018 11:33 AM        Opportunity for questions and clarification was provided.       Patient transported with:   Registered Nurse  Tech

## 2018-05-27 NOTE — IP AVS SNAPSHOT
[facility] 
 
 
 [address] Patient: Ricardo Lesches MRN: [id number] :1965 About your hospitalization You were admitted on:  May 27, 2018 You last received care in the:  [facility] [address] You were discharged on:  May 28, 2018 Why you were hospitalized Your primary diagnosis was:  Copd Exacerbation (Hcc) Your diagnoses also included:  Pneumonia, Hypokalemia, Anxiety Follow-up Information Follow up With Details Comments Contact Info Gary Jaime MD    [address] [facility] [address] 
[phone] Discharge Orders None A check leon indicates which time of day the medication should be taken. My Medications START taking these medications Instructions Each Dose to Equal  
 Morning Noon Evening Bedtime  
 albuterol-ipratropium 2.5 mg-0.5 mg/3 ml Nebu Commonly known as:  Harley Aparicio Your last dose was: Your next dose is:    
   
   
 3 mL by Nebulization route every four (4) hours as needed. 3 mL  
    
   
   
   
  
 docusate sodium 100 mg capsule Commonly known as:  Heron Pancho Your last dose was: Your next dose is: Take 1 Cap by mouth two (2) times daily as needed for Constipation for up to 90 days. 100 mg  
    
   
   
   
  
 guaiFENesin  mg ER tablet Commonly known as:  Pietro & Pietro Your last dose was: Your next dose is: Take 1 Tab by mouth every twelve (12) hours. 600 mg  
    
   
   
   
  
 levoFLOXacin 500 mg tablet Commonly known as:  [place] Your last dose was: Your next dose is: Take 1 Tab by mouth daily. 500 mg  
    
   
   
   
  
 tiotropium 18 mcg inhalation capsule Commonly known as:  [address] Your last dose was: Your next dose is: Take 1 Cap by inhalation daily. 1 Cap CHANGE how you take these medications Instructions Each Dose to Equal  
 Morning Noon Evening Bedtime  
 predniSONE 10 mg tablet Commonly known as:  Diana Andrea What changed:   
- medication strength 
- how much to take 
- how to take this - when to take this 
- additional instructions Your last dose was: Your next dose is:    
   
   
 Prednisone 10mg tabs: p.o. 4 tabs daily for 3 days then drop to  3 tabs daily for 3 days then drop to  2 tabs daily for 3 days then drop to  1 tab daily for 3 days then stop. Dispense 30 tabs CONTINUE taking these medications Instructions Each Dose to Equal  
 Morning Noon Evening Bedtime  
 albuterol 90 mcg/actuation inhaler Commonly known as:  PROVENTIL HFA, VENTOLIN HFA, PROAIR HFA Your last dose was: Your next dose is: Take 2 Puffs by inhalation every four (4) hours as needed for Wheezing or Shortness of Breath. 2 Puff PERCOCET  mg per tablet Generic drug:  oxyCODONE-acetaminophen Your last dose was: Your next dose is: Take 1 Tab by mouth every six (6) hours as needed for Pain. 1 Tab XANAX 0.25 mg tablet Generic drug:  ALPRAZolam  
   
Your last dose was: Your next dose is: Take 1 mg by mouth daily. 1 mg STOP taking these medications   
 azithromycin 250 mg tablet Commonly known as:  ZITHROMAX  
   
  
 ibuprofen 800 mg tablet Commonly known as:  MOTRIN  
   
  
 naproxen 500 mg tablet Commonly known as:  NAPROSYN Where to Get Your Medications Information on where to get these meds will be given to you by the nurse or doctor. ! Ask your nurse or doctor about these medications  
  albuterol-ipratropium 2.5 mg-0.5 mg/3 ml Nebu  
 docusate sodium 100 mg capsule  
 guaiFENesin  mg ER tablet  
 levoFLOXacin 500 mg tablet predniSONE 10 mg tablet  
 tiotropium 18 mcg inhalation capsule Opioid Education Prescription Opioids: What You Need to Know: 
 
Prescription opioids can be used to help relieve moderate-to-severe pain and are often prescribed following a surgery or injury, or for certain health conditions. These medications can be an important part of treatment but also come with serious risks. Opioids are strong pain medicines. Examples include hydrocodone, oxycodone, fentanyl, and morphine. Heroin is an example of an illegal opioid. It is important to work with your health care provider to make sure you are getting the safest, most effective care. WHAT ARE THE RISKS AND SIDE EFFECTS OF OPIOID USE? Prescription opioids carry serious risks of addiction and overdose, especially with prolonged use. An opioid overdose, often marked by slow breathing, can cause sudden death. The use of prescription opioids can have a number of side effects as well, even when taken as directed. · Tolerance-meaning you might need to take more of a medication for the same pain relief · Physical dependence-meaning you have symptoms of withdrawal when the medication is stopped. Withdrawal symptoms can include nausea, sweating, chills, diarrhea, stomach cramps, and muscle aches. Withdrawal can last up to several weeks, depending on which drug you took and how long you took it. · Increased sensitivity to pain · Constipation · Nausea, vomiting, and dry mouth · Sleepiness and dizziness · Confusion · Depression · Low levels of testosterone that can result in lower sex drive, energy, and strength · Itching and sweating RISKS ARE GREATER WITH:      
· History of drug misuse, substance use disorder, or overdose · Mental health conditions (such as depression or anxiety) · Sleep apnea · Older age (72 years or older) · Pregnancy Avoid alcohol while taking prescription opioids.   Also, unless specifically advised by your health care provider, medications to avoid include: · Benzodiazepines (such as Xanax or Valium) · Muscle relaxants (such as Soma or Flexeril) · Hypnotics (such as Ambien or Lunesta) · Other prescription opioids KNOW YOUR OPTIONS Talk to your health care provider about ways to manage your pain that don't involve prescription opioids. Some of these options may actually work better and have fewer risks and side effects. Options may include: 
· Pain relievers such as acetaminophen, ibuprofen, and naproxen · Some medications that are also used for depression or seizures · Physical therapy and exercise · Counseling to help patients learn how to cope better with triggers of pain and stress. · Application of heat or cold compress · Massage therapy · Relaxation techniques Be Informed Make sure you know the name of your medication, how much and how often to take it, and its potential risks & side effects. IF YOU ARE PRESCRIBED OPIOIDS FOR PAIN: 
· Never take opioids in greater amounts or more often than prescribed. Remember the goal is not to be pain-free but to manage your pain at a tolerable level. · Follow up with your primary care provider to: · Work together to create a plan on how to manage your pain. · Talk about ways to help manage your pain that don't involve prescription opioids. · Talk about any and all concerns and side effects. · Help prevent misuse and abuse. · Never sell or share prescription opioids · Help prevent misuse and abuse. · Store prescription opioids in a secure place and out of reach of others (this may include visitors, children, friends, and family). · Safely dispose of unused/unwanted prescription opioids: Find your community drug take-back program or your pharmacy mail-back program, or flush them down the toilet, following guidance from the Food and Drug Administration (www.fda.gov/Drugs/ResourcesForYou). · Visit www.cdc.gov/drugoverdose to learn about the risks of opioid abuse and overdose. · If you believe you may be struggling with addiction, tell your health care provider and ask for guidance or call Tiffanie Ahmadi at 1-887-296-CVTI. Discharge Instructions Chronic Obstructive Pulmonary Disease (COPD): Care Instructions Your Care Instructions Chronic obstructive pulmonary disease (COPD) is a general term for a group of lung diseases, including emphysema and chronic bronchitis. People with COPD have decreased airflow in and out of the lungs, which makes it hard to breathe. The airways also can get clogged with thick mucus. Cigarette smoking is a major cause of COPD. Although there is no cure for COPD, you can slow its progress. Following your treatment plan and taking care of yourself can help you feel better and live longer. Follow-up care is a key part of your treatment and safety. Be sure to make and go to all appointments, and call your doctor if you are having problems. It's also a good idea to know your test results and keep a list of the medicines you take. How can you care for yourself at home? ?Staying healthy ? · Do not smoke. This is the most important step you can take to prevent more damage to your lungs. If you need help quitting, talk to your doctor about stop-smoking programs and medicines. These can increase your chances of quitting for good. ? · Avoid colds and flu. Get a pneumococcal vaccine shot. If you have had one before, ask your doctor whether you need a second dose. Get the flu vaccine every fall. If you must be around people with colds or the flu, wash your hands often. ? · Avoid secondhand smoke, air pollution, and high altitudes. Also avoid cold, dry air and hot, humid air. Stay at home with your windows closed when air pollution is bad. ?Medicines and oxygen therapy ? · Take your medicines exactly as prescribed. Call your doctor if you think you are having a problem with your medicine. ? · You may be taking medicines such as: ¨ Bronchodilators. These help open your airways and make breathing easier. Bronchodilators are either short-acting (work for 6 to 9 hours) or long-acting (work for 24 hours). You inhale most bronchodilators, so they start to act quickly. Always carry your quick-relief inhaler with you in case you need it while you are away from home. ¨ Corticosteroids (prednisone, budesonide). These reduce airway inflammation. They come in pill or inhaled form. You must take these medicines every day for them to work well. ? · A spacer may help you get more inhaled medicine to your lungs. Ask your doctor or pharmacist if a spacer is right for you. If it is, ask how to use it properly. ? · Do not take any vitamins, over-the-counter medicine, or herbal products without talking to your doctor first.  
? · If your doctor prescribed antibiotics, take them as directed. Do not stop taking them just because you feel better. You need to take the full course of antibiotics. ? · Oxygen therapy boosts the amount of oxygen in your blood and helps you breathe easier. Use the flow rate your doctor has recommended, and do not change it without talking to your doctor first.  
Activity ? · Get regular exercise. Walking is an easy way to get exercise. Start out slowly, and walk a little more each day. ? · Pay attention to your breathing. You are exercising too hard if you cannot talk while you are exercising. ? · Take short rest breaks when doing household chores and other activities. ? · Learn breathing methods-such as breathing through pursed lips-to help you become less short of breath. ? · If your doctor has not set you up with a pulmonary rehabilitation program, talk to him or her about whether rehab is right for you.  Rehab includes exercise programs, education about your disease and how to manage it, help with diet and other changes, and emotional support. Diet ? · Eat regular, healthy meals. Use bronchodilators about 1 hour before you eat to make it easier to eat. Eat several small meals instead of three large ones. Drink beverages at the end of the meal. Avoid foods that are hard to chew. ? · Eat foods that contain protein so that you do not lose muscle mass. ? · Talk with your doctor if you gain too much weight or if you lose weight without trying. ?Mental health ? · Talk to your family, friends, or a therapist about your feelings. It is normal to feel frightened, angry, hopeless, helpless, and even guilty. Talking openly about bad feelings can help you cope. If these feelings last, talk to your doctor. When should you call for help? Call 911 anytime you think you may need emergency care. For example, call if: 
? · You have severe trouble breathing. ?Call your doctor now or seek immediate medical care if: 
? · You have new or worse trouble breathing. ? · You cough up blood. ? · You have a fever. ? Watch closely for changes in your health, and be sure to contact your doctor if: 
? · You cough more deeply or more often, especially if you notice more mucus or a change in the color of your mucus. ? · You have new or worse swelling in your legs or belly. ? · You are not getting better as expected. Where can you learn more? Go to http://shan-swati.info/. Simone Reeves in the search box to learn more about \"Chronic Obstructive Pulmonary Disease (COPD): Care Instructions. \" Current as of: May 12, 2017 Content Version: 11.4 © 3797-1964 [employer]. Care instructions adapted under license by [employer] (which disclaims liability or warranty for this information).  If you have questions about a medical condition or this instruction, always ask your healthcare professional. [place] [address] any warranty or liability for your use of this information. Discharge Instructions Patient: Fernando Carr MRN: [id number]  [facility]: [id number] YOB: 1965  Age: 46 y.o. Sex: female DOA: 5/27/2018 LOS:  LOS: 1 day   Discharge Date: DIET:  Cardiac Diet ACTIVITY: Activity as tolerated, no restrictions Please stop smoking to avoid worsening of breathing condition. ADDITIONAL INFORMATION: If you experience any of the following symptoms but not limited to Fever, chills, nausea, vomiting, diarrhea, change in mentation, falling, bleeding, shortness of breath, chest pain, please call your primary care physician or return to the emergency room if you cannot get hold of your doctor:  
 
FOLLOW UP CARE: 
Dr. Alesha Koch MD in 5-7 days. Please call and set up an appointment (patient states ? Appointment beginning of June, please confirm) Dr. Yareli Mosquera / Dr. Joby Avina in 2-3 weeks Jyothi Ngo NP 
5/28/2018 1:18 PM 
 
 
 
 
 
 
  
  
  
Introducing [facility]! [address] [place] [address] introduces Bauzaar patient portal. Now you can access parts of your medical record, email your doctor's office, and request medication refills online. 1. In your internet browser, go to [email or web address] 2. Click on the First Time User? Click Here link in the Sign In box. You will see the New Member Sign Up page. 3. Enter your Bauzaar Access Code exactly as it appears below. You will not need to use this code after youve completed the sign-up process. If you do not sign up before the expiration date, you must request a new code. · Bauzaar Access Code: [email or web address] Expires: 8/23/2018  1:55 PM 
 
4. Enter the last four digits of your Social Security Number (xxxx) and Date of Birth (mm/dd/yyyy) as indicated and click Submit. You will be taken to the next sign-up page. 5. Create a BiondVax ID. This will be your BiondVax login ID and cannot be changed, so think of one that is secure and easy to remember. 6. Create a BiondVax password. You can change your password at any time. 7. Enter your Password Reset Question and Answer. This can be used at a later time if you forget your password. 8. Enter your e-mail address. You will receive e-mail notification when new information is available in [facility] 9. Click Sign Up. You can now view and download portions of your medical record. 10. Click the Download Summary menu link to download a portable copy of your medical information. If you have questions, please visit the Frequently Asked Questions section of the BiondVax website. Remember, BiondVax is NOT to be used for urgent needs. For medical emergencies, dial 911. Now available from your iPhone and Android! Introducing Víctor Esparza As a [facility] patient, I wanted to make you aware of our electronic visit tool called Víctor Vilma. [facility] 24[facility] allows you to connect within minutes with a medical provider 24 hours a day, seven days a week via a mobile device or tablet or logging into a secure website from your computer. You can access Víctor Esparza from anywhere in the [place]. A virtual visit might be right for you when you have a simple condition and feel like you just dont want to get out of bed, or cant get away from work for an appointment, when your regular [facility] provider is not available (evenings, weekends or holidays), or when youre out of town and need minor care. Electronic visits cost only $49 and if the [facility] 24/7 provider determines a prescription is needed to treat your condition, one can be electronically transmitted to a nearby pharmacy*. Please take a moment to enroll today if you have not already done so. The enrollment process is free and takes just a few minutes.   To enroll, please download the [facility] 24/7 tobin to your tablet or phone, or visit [email or web address] to enroll on your computer. And, as an [address] [place] [address] patient with a Elasticsearch account, the results of your visits will be scanned into your electronic medical record and your primary care provider will be able to view the scanned results. We urge you to continue to see your regular [facility] provider for your ongoing medical care. And while your primary care provider may not be the one available when you seek a Gibberin virtual visit, the peace of mind you get from getting a real diagnosis real time can be priceless. For more information on Gibberin, view our Frequently Asked Questions (FAQs) at [email or web address]. Sincerely, 
 
Karen Hendrix MD 
Chief Medical Officer [facility] Elle Galicia *:  certain medications cannot be prescribed via Gibberin Unresulted Labs-Please follow up with your PCP about these lab tests Order Current Status CULTURE, BLOOD Preliminary result CULTURE, BLOOD Preliminary result Providers Seen During Your Hospitalization Provider Specialty Primary office phone Jose Martin Ramos MD Emergency Medicine [phone] Alex Falcon MD Internal Medicine [phone] Your Primary Care Physician (PCP) Primary Care Physician Office Phone Office Fax MIS, Lula [facility] [phone] You are allergic to the following Allergen Reactions Pcn (Penicillins) Hives Recent Documentation Height Weight BMI Smoking Status 1.753 m 57.6 kg 18.75 kg/m2 Heavy Tobacco Smoker Emergency Contacts Name Discharge Info Relation Home Work Mobile [facility] SERVICES DISCHARGE CAREGIVER [3] Friend [5] [phone] Patient Belongings The following personal items are in your possession at time of discharge: Dental Appliances: None  Visual Aid: None      Home Medications: None   Jewelry: None  Clothing: Pants, Shirt, Socks, With patient, Footwear    Other Valuables: Cell Phone Please provide this summary of care documentation to your next provider. Signatures-by signing, you are acknowledging that this After Visit Summary has been reviewed with you and you have received a copy. Patient Signature:  ____________________________________________________________ Date:  ____________________________________________________________  
  
[facility] Provider Signature:  ____________________________________________________________ Date:  ____________________________________________________________

## 2018-05-27 NOTE — IP AVS SNAPSHOT
[address] [place] [id number] [address] Patient: Zeyad Mosley MRN: [id number] :1965 A check leon indicates which time of day the medication should be taken. My Medications START taking these medications Instructions Each Dose to Equal  
 Morning Noon Evening Bedtime  
 albuterol-ipratropium 2.5 mg-0.5 mg/3 ml Nebu Commonly known as:  Jaelyn Marin Your last dose was: Your next dose is:    
   
   
 3 mL by Nebulization route every four (4) hours as needed. 3 mL  
    
   
   
   
  
 docusate sodium 100 mg capsule Commonly known as:  Jose Odonnell Your last dose was: Your next dose is: Take 1 Cap by mouth two (2) times daily as needed for Constipation for up to 90 days. 100 mg  
    
   
   
   
  
 guaiFENesin  mg ER tablet Commonly known as:  [address] Your last dose was: Your next dose is: Take 1 Tab by mouth every twelve (12) hours. 600 mg  
    
   
   
   
  
 levoFLOXacin 500 mg tablet Commonly known as:  Charo Vega Your last dose was: Your next dose is: Take 1 Tab by mouth daily. 500 mg  
    
   
   
   
  
 tiotropium 18 mcg inhalation capsule Commonly known as:  [address] [employer] Your last dose was: Your next dose is: Take 1 Cap by inhalation daily. 1 Cap CHANGE how you take these medications Instructions Each Dose to Equal  
 Morning Noon Evening Bedtime  
 predniSONE 10 mg tablet Commonly known as:  Leobardo Larose What changed:   
- medication strength 
- how much to take 
- how to take this - when to take this 
- additional instructions Your last dose was:     
   
Your next dose is:    
   
   
 Prednisone 10mg tabs: p.o. 4 tabs daily for 3 days then drop to  3 tabs daily for 3 days then drop to  2 tabs daily for 3 days then drop to  1 tab daily for 3 days then stop. Dispense 30 tabs CONTINUE taking these medications Instructions Each Dose to Equal  
 Morning Noon Evening Bedtime  
 albuterol 90 mcg/actuation inhaler Commonly known as:  PROVENTIL HFA, VENTOLIN HFA, PROAIR HFA Your last dose was: Your next dose is: Take 2 Puffs by inhalation every four (4) hours as needed for Wheezing or Shortness of Breath. 2 Puff PERCOCET  mg per tablet Generic drug:  oxyCODONE-acetaminophen Your last dose was: Your next dose is: Take 1 Tab by mouth every six (6) hours as needed for Pain. 1 Tab XANAX 0.25 mg tablet Generic drug:  ALPRAZolam  
   
Your last dose was: Your next dose is: Take 1 mg by mouth daily. 1 mg STOP taking these medications   
 azithromycin 250 mg tablet Commonly known as:  ZITHROMAX  
   
  
 ibuprofen 800 mg tablet Commonly known as:  MOTRIN  
   
  
 naproxen 500 mg tablet Commonly known as:  NAPROSYN Where to Get Your Medications Information on where to get these meds will be given to you by the nurse or doctor. ! Ask your nurse or doctor about these medications  
  albuterol-ipratropium 2.5 mg-0.5 mg/3 ml Nebu  
 docusate sodium 100 mg capsule  
 guaiFENesin  mg ER tablet  
 levoFLOXacin 500 mg tablet  
 predniSONE 10 mg tablet  
 tiotropium 18 mcg inhalation capsule

## 2018-05-27 NOTE — ED PROVIDER NOTES
EMERGENCY DEPARTMENT HISTORY AND PHYSICAL EXAM    12:09 PM      Date: 5/27/2018  Patient Name: Subhash Florence    History of Presenting Illness     Chief Complaint   Patient presents with    Pneumonia    Cough         History Provided By: Patient    Chief Complaint: Cough  Duration:  Days  Timing:  Constant  Location: Respiratory tract  Quality: Non-productive  Severity: Moderate  Modifying Factors: None  Associated Symptoms: low grade fevers, SOB, and anxiety about her sx. Additional History (Context): Subhash Florence is a 46 y.o. female smoker with a hx of fibromyalgia and chronic back pain who presents with complaints of a non-productive cough for the past 10 days. She notes associated intermittent low grade fevers, SOB, and anxiety concerning her sx. She reports that her PCP called in an rx for abx last week which she was taking but she continued to worsen, so she reported to the ED 4 days ago. While in the ED she was given a different antibiotic, an inhaler, and steroids which she has been taking with no relief. She has also been taking OTC Benadryl and Mucinex but no other cough suppressants as they have irritated her ulcers in the past. Her SOB today is the worst it has been over the past 10 days. She notes a hx of COPD but she is not on home O2. She denies N/V/D, abdominal pain, and any further complaints.       PCP: Roger Kumar MD    Current Facility-Administered Medications   Medication Dose Route Frequency Provider Last Rate Last Dose    sodium chloride (NS) flush 5-10 mL  5-10 mL IntraVENous PRN Germán Ziegler MD        sodium chloride 0.9 % bolus infusion 1,000 mL  1,000 mL IntraVENous Afshan Blanco MD        Followed by   [facility] sodium chloride 0.9 % bolus infusion 728 mL  728 mL IntraVENous ONCE Germán Ziegler MD        levoFLOXacin (LEVAQUIN) 750 mg in D5W IVPB  750 mg IntraVENous Q24H Germán Ziegler  mL/hr at 05/27/18 1308 750 mg at 05/27/18 1308    vancomycin (VANCOCIN) 1,000 mg in 0.9% sodium chloride (MBP/ADV) 250 mL adv  1,000 mg IntraVENous ONCE Skye Mahoney MD       Jennifer Estevez [START ON 5/28/2018] vancomycin (VANCOCIN) 1,000 mg in 0.9% sodium chloride (MBP/ADV) 250 mL adv  1,000 mg IntraVENous Q12H Skye Mahoney MD         Current Outpatient Prescriptions   Medication Sig Dispense Refill    azithromycin (ZITHROMAX) 250 mg tablet Does not need z-jojo- first dose given in ed  Take one tablet by mouth daily for next 4 days 4 Tab 0    predniSONE (DELTASONE) 20 mg tablet Take 2 Tabs by mouth daily for 4 days. With Breakfast 8 Tab 0    ibuprofen (MOTRIN) 800 mg tablet Take 800 mg by mouth daily.  ALPRAZolam (XANAX) 0.25 mg tablet Take 1 mg by mouth daily.  naproxen (NAPROSYN) 500 mg tablet Take 1 Tab by mouth two (2) times daily (with meals). 20 Tab 0    albuterol (PROVENTIL HFA, VENTOLIN HFA, PROAIR HFA) 90 mcg/actuation inhaler Take 2 Puffs by inhalation every four (4) hours as needed for Wheezing or Shortness of Breath. 1 Inhaler 0    oxyCODONE-acetaminophen (PERCOCET)  mg per tablet Take 1 Tab by mouth every six (6) hours as needed for Pain. Past History     Past Medical History:  Past Medical History:   Diagnosis Date    Chronic back pain     Fibromyalgia        Past Surgical History:  No past surgical history on file. Family History:  No family history on file. Social History:  Social History   Substance Use Topics    Smoking status: Heavy Tobacco Smoker     Packs/day: 1.00     Years: 30.00    Smokeless tobacco: Not on file    Alcohol use No       Allergies: Allergies   Allergen Reactions    Pcn [Penicillins] Hives         Review of Systems     Review of Systems   Constitutional: Positive for fever. Negative for chills. Respiratory: Positive for cough and shortness of breath. Cardiovascular: Negative for chest pain. Gastrointestinal: Negative for diarrhea, nausea and vomiting.    Psychiatric/Behavioral: The patient is nervous/anxious. All other systems reviewed and are negative. Physical Exam     Visit Vitals    /87 (BP 1 Location: Left arm, BP Patient Position: At rest)    Pulse 98    Temp 97.1 °F (36.2 °C)    Resp 22    Ht 5' 9\" (1.753 m)    Wt 57.6 kg (127 lb)    SpO2 97%    BMI 18.75 kg/m2       Physical Exam   Constitutional: She is oriented to person, place, and time. She appears well-developed and well-nourished. She appears distressed (moderate). HENT:   Head: Normocephalic and atraumatic. Eyes: Conjunctivae and EOM are normal. Right eye exhibits no discharge. Left eye exhibits no discharge. No scleral icterus. Neck: Normal range of motion. Neck supple. No tracheal deviation present. Cardiovascular: Normal rate, regular rhythm and normal heart sounds. No murmur heard. Pulmonary/Chest: Effort normal. No respiratory distress. She has wheezes (diffuse expiratory wheezes in all lung fields). She has no rales. Abdominal: Soft. She exhibits no distension. There is no tenderness. There is no rebound and no guarding. Musculoskeletal: Normal range of motion. She exhibits no edema or deformity. Neurological: She is alert and oriented to person, place, and time. No cranial nerve deficit. Skin: Skin is warm. She is diaphoretic. Psychiatric: She has a normal mood and affect.  Her behavior is normal. Judgment and thought content normal.         Diagnostic Study Results     Labs -  Recent Results (from the past 12 hour(s))   CBC WITH AUTOMATED DIFF    Collection Time: 05/27/18 11:33 AM   Result Value Ref Range    WBC 13.0 4.6 - 13.2 K/uL    RBC 4.17 (L) 4.20 - 5.30 M/uL    HGB 13.0 12.0 - 16.0 g/dL    HCT 37.0 35.0 - 45.0 %    MCV 88.7 74.0 - 97.0 FL    MCH 31.2 24.0 - 34.0 PG    MCHC 35.1 31.0 - 37.0 g/dL    RDW 12.7 11.6 - 14.5 %    PLATELET 105 395 - 024 K/uL    MPV 9.1 (L) 9.2 - 11.8 FL    NEUTROPHILS 88 (H) 40 - 73 %    LYMPHOCYTES 9 (L) 21 - 52 %    MONOCYTES 3 3 - 10 % EOSINOPHILS 0 0 - 5 %    BASOPHILS 0 0 - 2 %    ABS. NEUTROPHILS 11.5 (H) 1.8 - 8.0 K/UL    ABS. LYMPHOCYTES 1.1 0.9 - 3.6 K/UL    ABS. MONOCYTES 0.4 0.05 - 1.2 K/UL    ABS. EOSINOPHILS 0.0 0.0 - 0.4 K/UL    ABS. BASOPHILS 0.0 0.0 - 0.1 K/UL    DF AUTOMATED     METABOLIC PANEL, COMPREHENSIVE    Collection Time: 05/27/18 11:33 AM   Result Value Ref Range    Sodium 141 136 - 145 mmol/L    Potassium 3.4 (L) 3.5 - 5.5 mmol/L    Chloride 105 100 - 108 mmol/L    CO2 26 21 - 32 mmol/L    Anion gap 10 3.0 - 18 mmol/L    Glucose 108 (H) 74 - 99 mg/dL    BUN 10 7.0 - 18 MG/DL    Creatinine 0.91 0.6 - 1.3 MG/DL    BUN/Creatinine ratio 11 (L) 12 - 20      GFR est AA >60 >60 ml/min/1.73m2    GFR est non-AA >60 >60 ml/min/1.73m2    Calcium 9.2 8.5 - 10.1 MG/DL    Bilirubin, total 0.5 0.2 - 1.0 MG/DL    ALT (SGPT) 27 13 - 56 U/L    AST (SGOT) 22 15 - 37 U/L    Alk. phosphatase 86 45 - 117 U/L    Protein, total 8.5 (H) 6.4 - 8.2 g/dL    Albumin 3.8 3.4 - 5.0 g/dL    Globulin 4.7 (H) 2.0 - 4.0 g/dL    A-G Ratio 0.8 0.8 - 1.7     POC LACTIC ACID    Collection Time: 05/27/18 11:39 AM   Result Value Ref Range    Lactic Acid (POC) 3.1 (HH) 0.4 - 2.0 mmol/L       Radiologic Studies -   XR CHEST PORT   Final Result   IMPRESSION[de-identified]     1. No acute cardiopulmonary disease. Per Dr. Nazario Dunham am the first provider for this patient. I reviewed the vital signs, available nursing notes, past medical history, past surgical history, family history and social history. Vital Signs-Reviewed the patient's vital signs. Pulse Oximetry Analysis -  97% on room air (Interpretation)WNL    Cardiac Monitor:  Rate: 98BPM  Rhythm:  Normal Sinus Rhythm     EKG:   Interpreted by the EP   Time interpreted:1450   Rate:101 BPM   Rhythm:Sinus tachycardia   Interpretation:Nonspecific T-wave abnormality, no STEMI   Comparison:    Records Reviewed: Nursing Notes and Old Medical Records (Time of Review: 12:09 PM)    ED Course: Progress Notes, Reevaluation, and Consults:  Consult:  Discussed care with Dr. Bruce Wright, Specialty: Hospitalist  Standard discussion; including history of patients chief complaint, available diagnostic results, and treatment course. He agrees on admission. 1:49 PM, 05/27/18       Provider Notes (Medical Decision Making): Pt with pneumonia and COPD who has failed conservative tx as an outpatient. Will be admitted for IV abx and tx of her COPD exacerbation. For Hospitalized Patients:    1. Hospitalization Decision Time:  The decision to hospitalize the patient was made by Dr. Ted Ojeda at 1:40 PM on 5/27/2018    2. Aspirin: Aspirin was not given because the patient did not present with a stroke at the time of their Emergency Department evaluation    Diagnosis     Clinical Impression:   1. COPD exacerbation ([address]    2. Pneumonia of right lower lobe due to infectious organism [facility])        Disposition: Admit    _______________________________    Attestations:  Angelica [address] [place] [address] acting as a scribe for and in the presence of Paulino Walker MD      May 27, 2018 at 1:48 PM       Provider Attestation:      I personally performed the services described in the documentation, reviewed the documentation, as recorded by the scribe in my presence, and it accurately and completely records my words and actions.  May 27, 2018 at 1:48 PM - Paulino Walker MD    _______________________________

## 2018-05-27 NOTE — ED NOTES
Medications given per [facility], Patient has no additional complaints at this time.

## 2018-05-28 ENCOUNTER — HOME HEALTH ADMISSION (OUTPATIENT)
Dept: HOME HEALTH SERVICES | Facility: HOME HEALTH | Age: 53
End: 2018-05-28

## 2018-05-28 VITALS
OXYGEN SATURATION: 98 % | RESPIRATION RATE: 22 BRPM | TEMPERATURE: 99.4 F | BODY MASS INDEX: 18.87 KG/M2 | WEIGHT: 127.43 LBS | SYSTOLIC BLOOD PRESSURE: 144 MMHG | HEART RATE: 76 BPM | HEIGHT: 69 IN | DIASTOLIC BLOOD PRESSURE: 75 MMHG

## 2018-05-28 LAB
ANION GAP SERPL CALC-SCNC: 9 MMOL/L (ref 3–18)
APPEARANCE UR: CLEAR
BASOPHILS # BLD: 0 K/UL (ref 0–0.1)
BASOPHILS NFR BLD: 0 % (ref 0–2)
BILIRUB UR QL: NEGATIVE
BUN SERPL-MCNC: 10 MG/DL (ref 7–18)
BUN/CREAT SERPL: 13 (ref 12–20)
CALCIUM SERPL-MCNC: 8.7 MG/DL (ref 8.5–10.1)
CHLORIDE SERPL-SCNC: 105 MMOL/L (ref 100–108)
CO2 SERPL-SCNC: 24 MMOL/L (ref 21–32)
COLOR UR: YELLOW
CREAT SERPL-MCNC: 0.77 MG/DL (ref 0.6–1.3)
DIFFERENTIAL METHOD BLD: ABNORMAL
EOSINOPHIL # BLD: 0 K/UL (ref 0–0.4)
EOSINOPHIL NFR BLD: 0 % (ref 0–5)
ERYTHROCYTE [DISTWIDTH] IN BLOOD BY AUTOMATED COUNT: 12.7 % (ref 11.6–14.5)
GLUCOSE SERPL-MCNC: 133 MG/DL (ref 74–99)
GLUCOSE UR STRIP.AUTO-MCNC: NEGATIVE MG/DL
HCT VFR BLD AUTO: 33.6 % (ref 35–45)
HGB BLD-MCNC: 11.6 G/DL (ref 12–16)
HGB UR QL STRIP: NEGATIVE
KETONES UR QL STRIP.AUTO: NEGATIVE MG/DL
LACTATE SERPL-SCNC: 1.5 MMOL/L (ref 0.4–2)
LEUKOCYTE ESTERASE UR QL STRIP.AUTO: NEGATIVE
LYMPHOCYTES # BLD: 1 K/UL (ref 0.9–3.6)
LYMPHOCYTES NFR BLD: 8 % (ref 21–52)
MAGNESIUM SERPL-MCNC: 1.8 MG/DL (ref 1.6–2.6)
MCH RBC QN AUTO: 30.9 PG (ref 24–34)
MCHC RBC AUTO-ENTMCNC: 34.5 G/DL (ref 31–37)
MCV RBC AUTO: 89.4 FL (ref 74–97)
MONOCYTES # BLD: 0.4 K/UL (ref 0.05–1.2)
MONOCYTES NFR BLD: 4 % (ref 3–10)
NEUTS SEG # BLD: 10.9 K/UL (ref 1.8–8)
NEUTS SEG NFR BLD: 88 % (ref 40–73)
NITRITE UR QL STRIP.AUTO: NEGATIVE
PH UR STRIP: 6.5 [PH] (ref 5–8)
PHOSPHATE SERPL-MCNC: 2.9 MG/DL (ref 2.5–4.9)
PLATELET # BLD AUTO: 159 K/UL (ref 135–420)
PMV BLD AUTO: 8.9 FL (ref 9.2–11.8)
POTASSIUM SERPL-SCNC: 4.2 MMOL/L (ref 3.5–5.5)
PROT UR STRIP-MCNC: NEGATIVE MG/DL
RBC # BLD AUTO: 3.76 M/UL (ref 4.2–5.3)
SODIUM SERPL-SCNC: 138 MMOL/L (ref 136–145)
SP GR UR REFRACTOMETRY: 1.02 (ref 1–1.03)
UROBILINOGEN UR QL STRIP.AUTO: 1 EU/DL (ref 0.2–1)
WBC # BLD AUTO: 12.4 K/UL (ref 4.6–13.2)

## 2018-05-28 PROCEDURE — 36415 COLL VENOUS BLD VENIPUNCTURE: CPT | Performed by: INTERNAL MEDICINE

## 2018-05-28 PROCEDURE — 74011250636 HC RX REV CODE- 250/636: Performed by: EMERGENCY MEDICINE

## 2018-05-28 PROCEDURE — 83735 ASSAY OF MAGNESIUM: CPT | Performed by: INTERNAL MEDICINE

## 2018-05-28 PROCEDURE — 83605 ASSAY OF LACTIC ACID: CPT | Performed by: INTERNAL MEDICINE

## 2018-05-28 PROCEDURE — 81003 URINALYSIS AUTO W/O SCOPE: CPT | Performed by: EMERGENCY MEDICINE

## 2018-05-28 PROCEDURE — 74011250637 HC RX REV CODE- 250/637: Performed by: INTERNAL MEDICINE

## 2018-05-28 PROCEDURE — 85025 COMPLETE CBC W/AUTO DIFF WBC: CPT | Performed by: INTERNAL MEDICINE

## 2018-05-28 PROCEDURE — 74011250636 HC RX REV CODE- 250/636: Performed by: INTERNAL MEDICINE

## 2018-05-28 PROCEDURE — 80048 BASIC METABOLIC PNL TOTAL CA: CPT | Performed by: INTERNAL MEDICINE

## 2018-05-28 PROCEDURE — 84100 ASSAY OF PHOSPHORUS: CPT | Performed by: INTERNAL MEDICINE

## 2018-05-28 RX ORDER — PREDNISONE 10 MG/1
TABLET ORAL
Qty: 30 TAB | Refills: 0 | Status: SHIPPED | OUTPATIENT
Start: 2018-05-28

## 2018-05-28 RX ORDER — DOCUSATE SODIUM 100 MG/1
100 CAPSULE, LIQUID FILLED ORAL
Qty: 60 CAP | Refills: 0 | Status: SHIPPED | OUTPATIENT
Start: 2018-05-28 | End: 2018-08-26

## 2018-05-28 RX ORDER — IPRATROPIUM BROMIDE AND ALBUTEROL SULFATE 2.5; .5 MG/3ML; MG/3ML
3 SOLUTION RESPIRATORY (INHALATION)
Qty: 60 NEBULE | Refills: 0 | Status: SHIPPED | OUTPATIENT
Start: 2018-05-28

## 2018-05-28 RX ORDER — GUAIFENESIN 600 MG/1
600 TABLET, EXTENDED RELEASE ORAL EVERY 12 HOURS
Qty: 30 TAB | Refills: 0 | Status: SHIPPED | OUTPATIENT
Start: 2018-05-28

## 2018-05-28 RX ORDER — LEVOFLOXACIN 500 MG/1
500 TABLET, FILM COATED ORAL DAILY
Qty: 6 TAB | Refills: 0 | Status: SHIPPED | OUTPATIENT
Start: 2018-05-28

## 2018-05-28 RX ADMIN — LEVOFLOXACIN 750 MG: 5 INJECTION, SOLUTION INTRAVENOUS at 13:15

## 2018-05-28 RX ADMIN — ACETAMINOPHEN 650 MG: 325 TABLET ORAL at 09:24

## 2018-05-28 RX ADMIN — HEPARIN SODIUM 5000 UNITS: 5000 INJECTION, SOLUTION INTRAVENOUS; SUBCUTANEOUS at 06:21

## 2018-05-28 RX ADMIN — OXYCODONE HYDROCHLORIDE AND ACETAMINOPHEN 1 TABLET: 5; 325 TABLET ORAL at 06:21

## 2018-05-28 RX ADMIN — HEPARIN SODIUM 5000 UNITS: 5000 INJECTION, SOLUTION INTRAVENOUS; SUBCUTANEOUS at 14:27

## 2018-05-28 RX ADMIN — GUAIFENESIN 600 MG: 600 TABLET, EXTENDED RELEASE ORAL at 09:20

## 2018-05-28 RX ADMIN — SODIUM CHLORIDE 100 ML/HR: 900 INJECTION, SOLUTION INTRAVENOUS at 03:46

## 2018-05-28 RX ADMIN — POTASSIUM CHLORIDE 40 MEQ: 1500 TABLET, EXTENDED RELEASE ORAL at 09:20

## 2018-05-28 RX ADMIN — METHYLPREDNISOLONE SODIUM SUCCINATE 40 MG: 40 INJECTION, POWDER, FOR SOLUTION INTRAMUSCULAR; INTRAVENOUS at 09:20

## 2018-05-28 RX ADMIN — GUAIFENESIN 100 MG: 200 SOLUTION ORAL at 01:22

## 2018-05-28 RX ADMIN — ACETAMINOPHEN 650 MG: 325 TABLET ORAL at 14:27

## 2018-05-28 RX ADMIN — MORPHINE SULFATE 1 MG: 4 INJECTION INTRAVENOUS at 03:43

## 2018-05-28 RX ADMIN — METHYLPREDNISOLONE SODIUM SUCCINATE 40 MG: 40 INJECTION, POWDER, FOR SOLUTION INTRAMUSCULAR; INTRAVENOUS at 00:20

## 2018-05-28 NOTE — ROUTINE PROCESS
IDR/SLIDR Summary          Patient: Lenora Ponce MRN: [id number]    Age: 46 y.o. YOB: 1965 Room/Bed: [facility]   Admit Diagnosis: Pneumonia  Principal Diagnosis: COPD exacerbation (HCC)   Goals: Establish Effective Breathing Pattern  Readmission: NO  Quality Measure: PNA and COPD  VTE Prophylaxis: Mechanical and Chemical  Influenza Vaccine screening completed? YES  Pneumococcal Vaccine screening completed? NO  Mobility needs: Yes   Nutrition plan:Yes  Consults:P.T, O.T., Respiratory and Case Management    Financial concerns:Yes  Escalated to CM? YES  RRAT Score: 9   Interventions:COPD Educator to follow   Testing due for pt today?  YES  LOS: 1 days Expected length of stay 5 days  Discharge plan: Home   PCP: Annetta Cobb MD  Transportation needs: Yes    Days before discharge:discharge pending  Discharge disposition: Home    Signed:     Lindsay Fried RN  5/28/2018  12:39 AM

## 2018-05-28 NOTE — PROGRESS NOTES
Pt referred to home health services for this pt [facility], pt placed in the referral que for transfer for a safe discharge. Pt provided with indigent medications.

## 2018-05-28 NOTE — PROGRESS NOTES
conducted an initial consultation and Spiritual Assessment for [facility], who is a 46 y.o.,female. Patients Primary Language is: [place]. According to the patients EMR Holiness Affiliation is: Non Catholic.     The reason the Patient came to the hospital is:   Patient Active Problem List    Diagnosis Date Noted    Pneumonia 05/27/2018    COPD exacerbation ([facility] [address] 05/27/2018    Hypokalemia 05/27/2018    Anxiety 05/27/2018        The  provided the following Interventions:  Initiated a relationship of care and support. Patient said she is Jennifer Iraheta. Explored issues of cynthia, spirituality and/or Jainism needs while hospitalized. Listened empathically. She spoke hopefully about recovery but said she is concerned about her daughter in the [place] who is pregnant and in the hospital with complications. Provided chaplaincy education. Provided information about Spiritual Care Services. Offered prayer and assurance of continued prayers on patient's behalf. Chart reviewed. The following outcomes were achieved:  Patient shared some information about their medical narrative and spiritual journey/beliefs. Patient processed feeling about current hospitalization. Patient expressed gratitude for the 's visit. Spiritual Health Kit provided. Assessment:  Patient did not indicate any spiritual or Jainism issues which require Spiritual Care Services interventions at this time. Patient does not have any Jainism/cultural needs that will affect patients preferences in health care. Plan:  Chaplains will continue to follow and will provide pastoral care on an as needed or requested basis.  recommends patient page  on duty if patient shows signs of acute spiritual or emotional distress. Esteban Solares MDiv.   Board Certified [employer] [phone]

## 2018-05-28 NOTE — PROGRESS NOTES
Dr. Jennifer Back was informed of patient's 10 beats of ventricular ectopy/ v-tach per tele monitor. V/S checked, patient is awake, talking on the phone, denied chest pain, light headedness or dizziness. She stated she feels just fine. Dr. Jennifer Back stated nothing to be done now and just continue to observe. He was also informed of Lactic Acid = 4.2 mmol/L.  New order to recheck Lactic acid in am.

## 2018-05-28 NOTE — ROUTINE PROCESS
Bedside shift change report received from night shift nurse Mayco Patel. No change from previous assessment. Assumed care,patient alert awake in bed. With PIV on right AC with continuous IVF of NS  infusing @100 ml/hr. No problems noticed at this time will continue care.

## 2018-05-28 NOTE — PROGRESS NOTES
Problem: Falls - Risk of  Goal: *Absence of Falls  Document Pricila Fall Risk and appropriate interventions in the flowsheet.    Outcome: Progressing Towards Goal  Fall Risk Interventions:  Mobility Interventions: Bed/chair exit alarm, Patient to call before getting OOB, Utilize walker, cane, or other assitive device         Medication Interventions: Evaluate medications/consider consulting pharmacy         History of Falls Interventions: Bed/chair exit alarm, Door open when patient unattended, Room close to nurse's station

## 2018-05-28 NOTE — INTERDISCIPLINARY ROUNDS
IDR/SLIDR Summary          Patient: Tomas Meyer MRN: [id number]    Age: 46 y.o. YOB: 1965 Room/Bed: [facility]/   Admit Diagnosis: Pneumonia  Principal Diagnosis: COPD exacerbation (HCC)   Goals: Establish Effective Breathing Pattern  Readmission: NO  Quality Measure: PNA and COPD  VTE Prophylaxis: Mechanical and Chemical  Influenza Vaccine screening completed? YES  Pneumococcal Vaccine screening completed? NO  Mobility needs: Yes   Nutrition plan:Yes  Consults:P.T, O.T., Respiratory and Case Management    Financial concerns:Yes  Escalated to CM? YES  RRAT Score: 9   Interventions:COPD Educator to follow   Testing due for pt today?  YES  LOS: 1 days Expected length of stay 5 days  Discharge plan: Home   PCP: Shelley Frazier MD  Transportation needs: Yes    Days before discharge:discharge pending  Discharge disposition: Home    Signed:     Evelyn Rajput RN  5/28/2018  12:39 AM

## 2018-05-28 NOTE — DISCHARGE INSTRUCTIONS
Chronic Obstructive Pulmonary Disease (COPD): Care Instructions  Your Care Instructions    Chronic obstructive pulmonary disease (COPD) is a general term for a group of lung diseases, including emphysema and chronic bronchitis. People with COPD have decreased airflow in and out of the lungs, which makes it hard to breathe. The airways also can get clogged with thick mucus. Cigarette smoking is a major cause of COPD. Although there is no cure for COPD, you can slow its progress. Following your treatment plan and taking care of yourself can help you feel better and live longer. Follow-up care is a key part of your treatment and safety. Be sure to make and go to all appointments, and call your doctor if you are having problems. It's also a good idea to know your test results and keep a list of the medicines you take. How can you care for yourself at home? ?Staying healthy  ? · Do not smoke. This is the most important step you can take to prevent more damage to your lungs. If you need help quitting, talk to your doctor about stop-smoking programs and medicines. These can increase your chances of quitting for good. ? · Avoid colds and flu. Get a pneumococcal vaccine shot. If you have had one before, ask your doctor whether you need a second dose. Get the flu vaccine every fall. If you must be around people with colds or the flu, wash your hands often. ? · Avoid secondhand smoke, air pollution, and high altitudes. Also avoid cold, dry air and hot, humid air. Stay at home with your windows closed when air pollution is bad. ?Medicines and oxygen therapy  ? · Take your medicines exactly as prescribed. Call your doctor if you think you are having a problem with your medicine. ? · You may be taking medicines such as:  ¨ Bronchodilators. These help open your airways and make breathing easier. Bronchodilators are either short-acting (work for 6 to 9 hours) or long-acting (work for 24 hours).  You inhale most bronchodilators, so they start to act quickly. Always carry your quick-relief inhaler with you in case you need it while you are away from home. ¨ Corticosteroids (prednisone, budesonide). These reduce airway inflammation. They come in pill or inhaled form. You must take these medicines every day for them to work well. ? · A spacer may help you get more inhaled medicine to your lungs. Ask your doctor or pharmacist if a spacer is right for you. If it is, ask how to use it properly. ? · Do not take any vitamins, over-the-counter medicine, or herbal products without talking to your doctor first.   ? · If your doctor prescribed antibiotics, take them as directed. Do not stop taking them just because you feel better. You need to take the full course of antibiotics. ? · Oxygen therapy boosts the amount of oxygen in your blood and helps you breathe easier. Use the flow rate your doctor has recommended, and do not change it without talking to your doctor first.   Activity  ? · Get regular exercise. Walking is an easy way to get exercise. Start out slowly, and walk a little more each day. ? · Pay attention to your breathing. You are exercising too hard if you cannot talk while you are exercising. ? · Take short rest breaks when doing household chores and other activities. ? · Learn breathing methods-such as breathing through pursed lips-to help you become less short of breath. ? · If your doctor has not set you up with a pulmonary rehabilitation program, talk to him or her about whether rehab is right for you. Rehab includes exercise programs, education about your disease and how to manage it, help with diet and other changes, and emotional support. Diet  ? · Eat regular, healthy meals. Use bronchodilators about 1 hour before you eat to make it easier to eat. Eat several small meals instead of three large ones. Drink beverages at the end of the meal. Avoid foods that are hard to chew.    ? · Eat foods that contain protein so that you do not lose muscle mass. ? · Talk with your doctor if you gain too much weight or if you lose weight without trying. ?Mental health  ? · Talk to your family, friends, or a therapist about your feelings. It is normal to feel frightened, angry, hopeless, helpless, and even guilty. Talking openly about bad feelings can help you cope. If these feelings last, talk to your doctor. When should you call for help? Call 911 anytime you think you may need emergency care. For example, call if:  ? · You have severe trouble breathing. ?Call your doctor now or seek immediate medical care if:  ? · You have new or worse trouble breathing. ? · You cough up blood. ? · You have a fever. ? Watch closely for changes in your health, and be sure to contact your doctor if:  ? · You cough more deeply or more often, especially if you notice more mucus or a change in the color of your mucus. ? · You have new or worse swelling in your legs or belly. ? · You are not getting better as expected. Where can you learn more? Go to http://[employer].info/. Jomar Fierro in the search box to learn more about \"Chronic Obstructive Pulmonary Disease (COPD): Care Instructions. \"  Current as of: May 12, 2017  Content Version: 11.4  © 2899-9803 [employer]. Care instructions adapted under license by [employer] (which disclaims liability or warranty for this information). If you have questions about a medical condition or this instruction, always ask your healthcare professional. [place] [address] any warranty or liability for your use of this information. Discharge Instructions    Patient: Mendel Bhat MRN: [id number]  CSN: [id number]    YOB: 1965  Age: 46 y.o.   Sex: female    DOA: 5/27/2018 LOS:  LOS: 1 day   Discharge Date:      DIET:  Cardiac Diet    ACTIVITY: Activity as tolerated, no restrictions    Please stop smoking to avoid worsening of breathing condition. ADDITIONAL INFORMATION: If you experience any of the following symptoms but not limited to Fever, chills, nausea, vomiting, diarrhea, change in mentation, falling, bleeding, shortness of breath, chest pain, please call your primary care physician or return to the emergency room if you cannot get hold of your doctor:     FOLLOW UP CARE:  Dr. Savanah Goldberg MD in 5-7 days. Please call and set up an appointment (patient states ?  Appointment beginning of June, please confirm) Dr. Gloria Prather / Dr. Graeme Montez in 2-3 weeks     Kristine Palma NP  5/28/2018 1:18 PM

## 2018-05-28 NOTE — DISCHARGE SUMMARY
[facility]  [facility]       Patient: Lenora Ponce Age: 46 y.o. : 1965 MR#: [id number] SSN: [id number]  PCP on record: Annetta Cobb MD  Admit date: 2018  Discharge date: 2018    Disposition:    []Home   []Home with Home Health   []SNF/NH   []Rehab   [x]Home with family   []Alternate Facility:____________________    Discharge Diagnoses:                             1. COPD exacerbation   2. Mild hypokalemia  3. Anxiety  4. Tobacco abuse    Discharge Medications:     Current Discharge Medication List      START taking these medications    Details   guaiFENesin ER (MUCINEX) 600 mg ER tablet Take 1 Tab by mouth every twelve (12) hours. Qty: 30 Tab, Refills: 0      docusate sodium (COLACE) 100 mg capsule Take 1 Cap by mouth two (2) times daily as needed for Constipation for up to 90 days. Qty: 60 Cap, Refills: 0      levoFLOXacin (LEVAQUIN) 500 mg tablet Take 1 Tab by mouth daily. Qty: 6 Tab, Refills: 0      tiotropium (SPIRIVA WITH HANDIHALER) 18 mcg inhalation capsule Take 1 Cap by inhalation daily. Qty: 30 Cap, Refills: 0      albuterol-ipratropium (DUO-NEB) 2.5 mg-0.5 mg/3 ml nebu 3 mL by Nebulization route every four (4) hours as needed. Qty: 60 Nebule, Refills: 0         CONTINUE these medications which have CHANGED    Details   predniSONE (DELTASONE) 10 mg tablet Prednisone 10mg tabs: p.o.  4 tabs daily for 3 days then drop to   3 tabs daily for 3 days then drop to   2 tabs daily for 3 days then drop to   1 tab daily for 3 days then stop. Dispense 30 tabs  Qty: 30 Tab, Refills: 0         CONTINUE these medications which have NOT CHANGED    Details   oxyCODONE-acetaminophen (PERCOCET)  mg per tablet Take 1 Tab by mouth every six (6) hours as needed for Pain. ALPRAZolam (XANAX) 0.25 mg tablet Take 1 mg by mouth daily.       albuterol (PROVENTIL HFA, VENTOLIN HFA, PROAIR HFA) 90 mcg/actuation inhaler Take 2 Puffs by inhalation every four (4) hours as needed for Wheezing or Shortness of Breath. Qty: 1 Inhaler, Refills: 0         STOP taking these medications       azithromycin (ZITHROMAX) 250 mg tablet Comments:   Reason for Stopping:         ibuprofen (MOTRIN) 800 mg tablet Comments:   Reason for Stopping:         naproxen (NAPROSYN) 500 mg tablet Comments:   Reason for Stopping:             Consults:    - none    Procedures:  -   None    Significant Diagnostic Studies:   -  XR CHEST PORT on 05/27/2018  IMPRESSION[de-identified]     1. No acute cardiopulmonary disease. Hospital Course by Problem   1. COPD exacerbation - Patient admitted with acute shortness of breath and cough with no evidence of pneumonia per imaging. Patient was started on IV solumedrol, mucolytics and bronchodilators with positive effect. On day of discharge with breathing much improved and with ambulation oxygen sats remain 93-97 % on RA. Patient provided with steroid taper, empiric antibiotics, bronchodilators to include nebulizer (patient states having machine at home) and spiriva. Patient states she has formal COPD diagnosed approximately 1 year ago but not following with pulmonary - discussed referral on discharge. 2.  Mild hypokalemia - Potassium was 3.4 on admission and thus received replaced on day of discharge level was 4.2. No potassium provided on discharge - follow as outpatient for recheck. 3.  Anxiety - acute anxiety in setting of shortness of breath, controlled on day of discharge. Cont xanax per PCP. 4.  Tobacco abuse - Continues to smoke 1PPD of cigarettes prior to admission. Discussed need for cessation in regards to worsening of COPD and overall medical health. Patient defers any cessation aide, states she is motivated to quit - encourage patient to do so with family (significant other) at bedside. Today's examination of the patient revealed:     Subjective:   Feels well, denies shortness of breath at rest or with ambulation.   Occasional non-productive cough.   Reports chronic lower back pain, no chest pain, n/v/constipation  Objective:   VS:   Visit Vitals    /75 (BP 1 Location: Left arm, BP Patient Position: At rest)    Pulse 76    Temp 99.4 °F (37.4 °C)    Resp 22    Ht 5' 9\" (1.753 m)    Wt 57.8 kg (127 lb 6.8 oz)    SpO2 98%    BMI 18.82 kg/m2      Tmax/24hrs: Temp (24hrs), Av.6 °F (37 °C), Min:97.9 °F (36.6 °C), Max:99.4 °F (37.4 °C)     Input/Output:   Intake/Output Summary (Last 24 hours) at 18 1420  Last data filed at 18 1315   Gross per 24 hour   Intake          3015.01 ml   Output             2000 ml   Net          1015.01 ml     General:  Alert, NAD, talkative  Cardiovascular:  RRR  Pulmonary:  LSC throughout; extensive conversation without any SOB; sats 93-97% with ambulation on RA  GI:  +BS in all four quadrants, soft, non-tender  Extremities:  No edema; 2+ dorsalis pedis pulses bilaterally  Neurology: Patient A&O x 4    Labs:    Recent Results (from the past 24 hour(s))   EKG, 12 LEAD, INITIAL    Collection Time: 18  2:40 PM   Result Value Ref Range    Ventricular Rate 108 BPM    Atrial Rate 108 BPM    P-R Interval 182 ms    QRS Duration 80 ms    Q-T Interval 322 ms    QTC Calculation (Bezet) 431 ms    Calculated P Axis 93 degrees    Calculated R Axis 96 degrees    Calculated T Axis -53 degrees    Diagnosis       Suspect arm lead reversal, interpretation assumes no reversal  Sinus tachycardia with frequent premature ventricular complexes  Rightward axis  ST & T wave abnormality, consider inferior ischemia  Abnormal ECG    Confirmed by Annalise Ayon MD, Amie Caceres ([id number]) on 2018 6:38:30 PM     POC LACTIC ACID    Collection Time: 18  3:02 PM   Result Value Ref Range    Lactic Acid (POC) 6.3 (HH) 0.4 - 2.0 mmol/L   POC LACTIC ACID    Collection Time: 18  4:36 PM   Result Value Ref Range    Lactic Acid (POC) 5.8 (HH) 0.4 - 2.0 mmol/L   LACTIC ACID    Collection Time: 18 10:10 PM   Result Value Ref Range    Lactic acid 4.2 (HH) 0.4 - 2.0 MMOL/L   URINALYSIS W/ RFLX MICROSCOPIC    Collection Time: 05/28/18  1:30 AM   Result Value Ref Range    Color YELLOW      Appearance CLEAR      Specific gravity 1.018 1.005 - 1.030      pH (UA) 6.5 5.0 - 8.0      Protein NEGATIVE  NEG mg/dL    Glucose NEGATIVE  NEG mg/dL    Ketone NEGATIVE  NEG mg/dL    Bilirubin NEGATIVE  NEG      Blood NEGATIVE  NEG      Urobilinogen 1.0 0.2 - 1.0 EU/dL    Nitrites NEGATIVE  NEG      Leukocyte Esterase NEGATIVE  NEG     METABOLIC PANEL, BASIC    Collection Time: 05/28/18  2:01 AM   Result Value Ref Range    Sodium 138 136 - 145 mmol/L    Potassium 4.2 3.5 - 5.5 mmol/L    Chloride 105 100 - 108 mmol/L    CO2 24 21 - 32 mmol/L    Anion gap 9 3.0 - 18 mmol/L    Glucose 133 (H) 74 - 99 mg/dL    BUN 10 7.0 - 18 MG/DL    Creatinine 0.77 0.6 - 1.3 MG/DL    BUN/Creatinine ratio 13 12 - 20      GFR est AA >60 >60 ml/min/1.73m2    GFR est non-AA >60 >60 ml/min/1.73m2    Calcium 8.7 8.5 - 10.1 MG/DL   MAGNESIUM    Collection Time: 05/28/18  2:01 AM   Result Value Ref Range    Magnesium 1.8 1.6 - 2.6 mg/dL   PHOSPHORUS    Collection Time: 05/28/18  2:01 AM   Result Value Ref Range    Phosphorus 2.9 2.5 - 4.9 MG/DL   CBC WITH AUTOMATED DIFF    Collection Time: 05/28/18  2:01 AM   Result Value Ref Range    WBC 12.4 4.6 - 13.2 K/uL    RBC 3.76 (L) 4.20 - 5.30 M/uL    HGB 11.6 (L) 12.0 - 16.0 g/dL    HCT 33.6 (L) 35.0 - 45.0 %    MCV 89.4 74.0 - 97.0 FL    MCH 30.9 24.0 - 34.0 PG    MCHC 34.5 31.0 - 37.0 g/dL    RDW 12.7 11.6 - 14.5 %    PLATELET 721 509 - 375 K/uL    MPV 8.9 (L) 9.2 - 11.8 FL    NEUTROPHILS 88 (H) 40 - 73 %    LYMPHOCYTES 8 (L) 21 - 52 %    MONOCYTES 4 3 - 10 %    EOSINOPHILS 0 0 - 5 %    BASOPHILS 0 0 - 2 %    ABS. NEUTROPHILS 10.9 (H) 1.8 - 8.0 K/UL    ABS. LYMPHOCYTES 1.0 0.9 - 3.6 K/UL    ABS. MONOCYTES 0.4 0.05 - 1.2 K/UL    ABS. EOSINOPHILS 0.0 0.0 - 0.4 K/UL    ABS.  BASOPHILS 0.0 0.0 - 0.1 K/UL    DF AUTOMATED     LACTIC ACID    Collection Time: 05/28/18 10:07 AM   Result Value Ref Range    Lactic acid 1.5 0.4 - 2.0 MMOL/L     Additional Data Reviewed:     Condition:   Follow-up Appointments:   Other (Specify) Dr. Karen Logan in 5-7 days (patient states ?  Appointment beginning of June, please confirm) Dr. Carolyn Becerra / Dr. Kelly Clarke in 2-3 weeks    >30 minutes spent coordinating this discharge (review instructions/follow-up, prescriptions, preparing report for sign off)    Signed:  Lalo Mendoza NP  5/28/2018  2:20 PM

## 2018-05-28 NOTE — ROUTINE PROCESS
Bedside and Verbal shift change report given to Tammy James (oncoming nurse) by Shana Venegas RN (offgoing nurse). Report included the following information SBAR, Kardex, MAR and Recent Results.     SITUATION:  Code Status: Full Code  Reason for Admission: Pneumonia  Hospital day: 1  Problem List:       Hospital Problems  Never Reviewed          Codes Class Noted POA    Pneumonia ICD-10-CM: J18.9  ICD-9-CM: 401  5/27/2018 Unknown        * (Principal)COPD exacerbation ([address] ICD-10-CM: J44.1  ICD-9-CM: 491.21  5/27/2018 Unknown        Hypokalemia ICD-10-CM: E87.6  ICD-9-CM: 276.8  5/27/2018 Unknown        Anxiety ICD-10-CM: F41.9  ICD-9-CM: 300.00  5/27/2018 Unknown              BACKGROUND:   Past Medical History:   Past Medical History:   Diagnosis Date    Chronic back pain     Fibromyalgia       Patient taking anticoagulants yes    Patient has a defibrillator: no    History of shots NO for example, flu, pneumonia, tetanus   Isolation History NO for example, MRSA, CDiff    ASSESSMENT:  Changes in Assessment Throughout Shift: none  Significant Changes in 24 hours (for example, RR/code, fall)  Patient has Central Line: no   Patient has Recinos Cath: no   Mobility Issues  PT  IV Patency  OR Checklist  Pending Tests    Last Vitals:  Vitals w/ MEWS Score (last day)     Date/Time MEWS Score Pulse Resp Temp BP Level of Consciousness SpO2    05/27/18 2348 1 79 18 97.9 °F (36.6 °C) (!)  155/91 Alert 96 %    05/27/18 2325 2 77 22 98.4 °F (36.9 °C) 134/75 Alert 97 %    05/27/18 2000 2 89 22 98.2 °F (36.8 °C) 156/87 Alert 99 %    05/27/18 1850 2 98 22 99.1 °F (37.3 °C) 164/90 Alert 98 %    05/27/18 1821 -- -- -- -- -- -- 97 %    05/27/18 1630 -- 97 26 -- 151/84 -- 97 %    05/27/18 1530 -- (!)  113 -- -- 150/83 -- 98 %    05/27/18 1500 -- (!)  114 22 -- (!)  157/96 -- 95 %    05/27/18 1400 -- 100 22 -- 124/76 -- 97 %    05/27/18 1330 -- -- -- -- 133/76 -- 100 %    05/27/18 1048 2 98 22 97.1 °F (36.2 °C) 151/87 Alert 97 % PAIN    Pain Assessment    Pain Intensity 1: 2 (05/27/18 2348)    Pain Location 1: Back    Pain Intervention(s) 1: Medication (see MAR)    Patient Stated Pain Goal: 0  Intervention effective: yes  Time of last intervention: 0621 Reassessment Completed: yes   Other actions taken for pain: Distraction    Last 3 Weights:  Last 3 Recorded Weights in this Encounter    05/27/18 1048   Weight: 57.6 kg (127 lb)   Weight change:     INTAKE/OUPUT    Current Shift:      Last three shifts: 05/26 0701 - 05/27 1900  In: 71.7 [I.V.:71.7]  Out: -     RECOMMENDATIONS AND DISCHARGE PLANNING  Patient needs and requests: Pain Control, Effective Breathing Pattern    Pending tests/procedures: labs     Discharge plan for patient: Home    Discharge planning Needs or Barriers: none    Estimated Discharge Date: 5/29/2018 Posted on Whiteboard in Patients Room: yes       \"HEALS\" SAFETY CHECK  A safety check occurred in the patient's room between off going nurse and oncoming nurse listed above. The safety check included the below items:    H  High Alert Medications Verify all high alert medication drips (heparin, PCA, etc.)  E  Equipment Suction is set up for ALL patients (with akshatker)  Red plugs utilized for all equipment (IV pumps, etc.)  WOWs wiped down at end of shift. Room stocked with oxygen, suction, and other unit-specific supplies  A  Alarms Bed alarm is set for fall risk patients  Ensure chair alarm is in place and activated if patient is up in a chair  L  Lines Check IV for any infiltration  Recinos bag is empty if patient has a Recinos   Tubing and IV bags are labeled  S  Safety  Room is clean, patient is clean, and equipment is clean. Hallways are clear from equipment besides carts. Fall bracelet on for fall risk patients  Ensure room is clear and free of clutter  Suction is set up for ALL patients (with daryl)  Hallways are clear from equipment besides carts.    Isolation precautions followed, supplies available outside room, sign posted    Arthbharat Pool RN

## 2018-05-31 ENCOUNTER — HOME CARE VISIT (OUTPATIENT)
Dept: SCHEDULING | Facility: HOME HEALTH | Age: 53
End: 2018-05-31

## 2018-05-31 PROCEDURE — G0299 HHS/HOSPICE OF RN EA 15 MIN: HCPCS

## 2018-06-02 LAB
BACTERIA SPEC CULT: NORMAL
BACTERIA SPEC CULT: NORMAL
SERVICE CMNT-IMP: NORMAL
SERVICE CMNT-IMP: NORMAL